# Patient Record
Sex: FEMALE | Race: WHITE | Employment: FULL TIME | URBAN - METROPOLITAN AREA
[De-identification: names, ages, dates, MRNs, and addresses within clinical notes are randomized per-mention and may not be internally consistent; named-entity substitution may affect disease eponyms.]

---

## 2017-03-13 ENCOUNTER — OFFICE VISIT (OUTPATIENT)
Dept: INTERNAL MEDICINE CLINIC | Age: 42
End: 2017-03-13

## 2017-03-13 VITALS
HEART RATE: 74 BPM | WEIGHT: 236.8 LBS | DIASTOLIC BLOOD PRESSURE: 74 MMHG | TEMPERATURE: 98.1 F | BODY MASS INDEX: 40.43 KG/M2 | OXYGEN SATURATION: 98 % | RESPIRATION RATE: 16 BRPM | HEIGHT: 64 IN | SYSTOLIC BLOOD PRESSURE: 114 MMHG

## 2017-03-13 DIAGNOSIS — E55.9 VITAMIN D DEFICIENCY: ICD-10-CM

## 2017-03-13 DIAGNOSIS — E53.8 B12 DEFICIENCY: ICD-10-CM

## 2017-03-13 DIAGNOSIS — Z00.00 WELL ADULT EXAM: Primary | ICD-10-CM

## 2017-03-13 DIAGNOSIS — M77.11 LATERAL EPICONDYLITIS, RIGHT ELBOW: ICD-10-CM

## 2017-03-13 NOTE — PATIENT INSTRUCTIONS
Tennis Elbow: Exercises  Your Care Instructions  Here are some examples of typical rehabilitation exercises for your condition. Start each exercise slowly. Ease off the exercise if you start to have pain. Your doctor or physical therapist will tell you when you can start these exercises and which ones will work best for you. How to do the exercises  Wrist flexor stretch    1. Extend your arm in front of you with your palm up. 2. Bend your wrist, pointing your hand toward the floor. 3. With your other hand, gently bend your wrist farther until you feel a mild to moderate stretch in your forearm. 4. Hold for at least 15 to 30 seconds. Repeat 2 to 4 times. Wrist extensor stretch    Repeat steps 1 to 4 of the stretch above but begin with your extended hand palm down. Ball or sock squeeze    1. Hold a tennis ball (or a rolled-up sock) in your hand. 2. Make a fist around the ball (or sock) and squeeze. 3. Hold for about 6 seconds, and then relax for up to 10 seconds. 4. Repeat 8 to 12 times. 5. Switch the ball (or sock) to your other hand and do 8 to 12 times. Wrist deviation    1. Sit so that your arm is supported but your hand hangs off the edge of a flat surface, such as a table. 2. Hold your hand out like you are shaking hands with someone. 3. Move your hand up and down. 4. Repeat this motion 8 to 12 times. 5. Switch arms. 6. Try to do this exercise twice with each hand. Wrist curls    1. Place your forearm on a table with your hand hanging over the edge of the table, palm up. 2. Place a 1- to 2-pound weight in your hand. This may be a dumbbell, a can of food, or a filled water bottle. 3. Slowly raise and lower the weight while keeping your forearm on the table and your palm facing up. 4. Repeat this motion 8 to 12 times. 5. Switch arms, and do steps 1 through 4.  6. Repeat with your hand facing down toward the floor. Switch arms. Biceps curls    1.  Sit leaning forward with your legs slightly spread and your left hand on your left thigh. 2. Place your right elbow on your right thigh, and hold the weight with your forearm horizontal.  3. Slowly curl the weight up and toward your chest.  4. Repeat this motion 8 to 12 times. 5. Switch arms, and do steps 1 through 4. Follow-up care is a key part of your treatment and safety. Be sure to make and go to all appointments, and call your doctor if you are having problems. It's also a good idea to know your test results and keep a list of the medicines you take. Where can you learn more? Go to http://lalo-pablo.info/. Enter Q212 in the search box to learn more about \"Tennis Elbow: Exercises. \"  Current as of: May 23, 2016  Content Version: 11.1  © 5529-9406 [employer]. Care instructions adapted under license by [employer] (which disclaims liability or warranty for this information). If you have questions about a medical condition or this instruction, always ask your healthcare professional. [address] any warranty or liability for your use of this information.

## 2017-03-13 NOTE — PROGRESS NOTES
HPI  Ms. Claudia Meng is a 46y.o. year old female, she is seen today to re-establish care, hasn't had a physical in a while. Sees ob/gyn yearly. Son is 132 years old, just potty trained. 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 [facility]. Complains of mild right elbow pain, worse with certain movements - no known injury. Exercises in morning - 30 min 3-4 x per week. Has had lap band filled again - has had 3 adjustments since last visit. Last adjustment 3/3/17 and couldn't keep anything down - readjusted again 3/8 and had fluid added back. Now able to keep food down. Lost 31# since last visit 3 years ago. Tingling in hands when wakes ups, doesn't notice other times of day. No cp, sob, cough, or wheezing. No change in bowels, melena or brbpr. Chief Complaint   Patient [facility] with   [facility].     Room 1// NON fasting    Elbow Pain     R elbow pain          Prior to Admission medications    Not on File         Allergies   Allergen Reactions    Hydrocodone Nausea and Vomiting         REVIEW OF SYSTEMS:  Per HPI    PHYSICAL EXAM:  Visit Vitals    /74 (BP 1 Location: Right arm, BP Patient Position: Sitting)    Pulse 74    Temp 98.1 °F (36.7 °C) (Oral)    Resp 16    Ht 5' 4\" (1.626 m)    Wt 236 lb 12.8 oz (107.4 kg)    LMP 03/09/2017 (Exact Date)    SpO2 98%    BMI 40.65 kg/m2     Constitutional: Appears well-developed and well-nourished. No distress. HENT:   Head: Normocephalic and atraumatic. Eyes: No scleral icterus. Mouth: OP clear without lesions, no pharyngeal exudate  Neck: no lad, no tm, supple   Cardiovascular: Normal S1/S2, regular rhythm. No murmurs, rubs, or gallops. Pulmonary/Chest: Effort normal and breath sounds normal. No respiratory distress. No wheezes, rhonchi, or rales. Abdomen: Soft, NT/ND, +BS, no rebound or guarding, +palpable port mid abdomen, no HSM appreciated.    Right elbow: +pain over lateral epicondylar groove  Ext: No edema. Neurological: Alert. Psychiatric: Normal mood and affect. Behavior is normal.     Lab Results   Component Value Date/Time    Sodium 141 02/04/2014 10:06 AM    Potassium 4.1 02/04/2014 10:06 AM    Chloride 103 02/04/2014 10:06 AM    CO2 21 02/04/2014 10:06 AM    Anion gap 12 10/26/2010 10:17 AM    Glucose 84 02/04/2014 10:06 AM    BUN 15 02/04/2014 10:06 AM    Creatinine 0.96 02/04/2014 10:06 AM    BUN/Creatinine ratio 16 02/04/2014 10:06 AM    GFR est AA 88 02/04/2014 10:06 AM    GFR est non-AA 76 02/04/2014 10:06 AM    Calcium 9.4 02/04/2014 10:06 AM    Bilirubin, total 0.5 02/04/2014 10:06 AM    AST (SGOT) 19 02/04/2014 10:06 AM    Alk. phosphatase 85 02/04/2014 10:06 AM    Protein, total 7.0 02/04/2014 10:06 AM    Albumin 4.3 02/04/2014 10:06 AM    Globulin 3.5 10/26/2010 10:17 AM    A-G Ratio 1.6 02/04/2014 10:06 AM    ALT (SGPT) 17 02/04/2014 10:06 AM     No results found for: HBA1C, HGBE8, MAU3TLHR, JBD5CRHA   Lab Results   Component Value Date/Time    Cholesterol, total 174 02/04/2014 10:06 AM    HDL Cholesterol 64 02/04/2014 10:06 AM    LDL, calculated 99 02/04/2014 10:06 AM    VLDL, calculated 11 02/04/2014 10:06 AM    Triglyceride 53 02/04/2014 10:06 AM    CHOL/HDL Ratio 3.4 10/26/2010 10:17 AM          ASSESSMENT/PLAN  Nino Nino was seen today for establish care and elbow pain. Diagnoses and all orders for this visit:    Well adult exam  -     METABOLIC PANEL, COMPREHENSIVE; Future  -     LIPID PANEL; Future  -     HEMOGLOBIN A1C WITH EAG; Future  Encouraged continued exercise, portion control    Vitamin D deficiency  -     VITAMIN D, 25 HYDROXY; Future    B12 deficiency  -     VITAMIN B12; Future    Lateral epicondylitis, right elbow  Can try counter brace, also handout with exercises given    For weight see #1 above    There are no preventive care reminders to display for this patient.      Follow-up Disposition:  Return in about 1 year (around 3/13/2018) for well exam. Reviewed plan of care. Patient has provided input and agrees with goals. The nurse provided the patient and/or family with advanced directive information if needed and encouraged the patient to provide a copy to the office when available.

## 2017-03-13 NOTE — PROGRESS NOTES
Patient received paperwork for advance directive during previous visit but has not completed at this time     Reviewed record In preparation for visit and have obtained necessary documentation      1. Have you been to the ER, urgent care clinic since your last visit? Hospitalized since your last visit? NO    2. Have you seen or consulted any other health care providers outside of the [facility] since your last visit?   Pt sees Dr Wei Mcgee for her lap band adjustments

## 2017-03-13 NOTE — MR AVS SNAPSHOT
Visit Information Date & Time Provider Department Dept. Phone Encounter #  
 3/13/2017  3:15 PM Jaime Reyes MD [address] [place] [address] [phone] [id number] Follow-up Instructions Return in about 1 year (around 3/13/2018) for well exam. Upcoming Health Maintenance Date Due  
 PAP AKA CERVICAL CYTOLOGY 4/30/2019 DTaP/Tdap/Td series (2 - Td) 10/4/2023 Allergies as of 3/13/2017  Review Complete On: 3/13/2017 By: Jaime Reyes MD  
  
 Severity Noted Reaction Type Reactions Hydrocodone  01/04/2010   Side Effect Nausea and Vomiting Current Immunizations  Reviewed on 1/28/2016 Name Date Influenza Nasal Vaccine (Quad) 12/4/2015 Influenza Vaccine 10/9/2013 Influenza Vaccine Split 10/26/2010 Tdap 10/4/2013 Not reviewed this visit You Were Diagnosed With   
  
 Codes Comments Well adult exam    -  Primary ICD-10-CM: Z00.00 ICD-9-CM: V70.0 Vitamin D deficiency     ICD-10-CM: E55.9 ICD-9-CM: 268.9 B12 deficiency     ICD-10-CM: E53.8 ICD-9-CM: 266.2 Lateral epicondylitis, right elbow     ICD-10-CM: M77.11 ICD-9-CM: 726.32 Vitals BP Pulse Temp Resp Height(growth percentile) Weight(growth percentile) 114/74 (BP 1 Location: Right arm, BP Patient Position: Sitting) 74 98.1 °F (36.7 °C) (Oral) 16 5' 4\" (1.626 m) 236 lb 12.8 oz (107.4 kg) LMP SpO2 BMI OB Status Smoking Status 03/09/2017 (Exact Date) 98% 40.65 kg/m2 Having regular periods Former Smoker Vitals History BMI and BSA Data Body Mass Index Body Surface Area  
 40.65 kg/m 2 2.2 m 2 Preferred Pharmacy Pharmacy Name Phone [id number] [phone] Your Updated Medication List  
  
Notice  As of 3/13/2017  4:16 PM  
 You have not been prescribed any medications. Follow-up Instructions  Return in about 1 year (around 3/13/2018) for well exam.  
  
 To-Do List   
 03/14/2017 Lab:  HEMOGLOBIN A1C WITH EAG   
  
 03/14/2017 Lab:  LIPID PANEL   
  
 03/14/2017 Lab:  METABOLIC PANEL, COMPREHENSIVE   
  
 03/14/2017 Lab:  VITAMIN B12   
  
 03/14/2017 Lab:  VITAMIN D, 25 HYDROXY Patient Instructions Tennis Elbow: Exercises Your Care Instructions Here are some examples of typical rehabilitation exercises for your condition. Start each exercise slowly. Ease off the exercise if you start to have pain. Your doctor or physical therapist will tell you when you can start these exercises and which ones will work best for you. How to do the exercises Wrist flexor stretch 1. Extend your arm in front of you with your palm up. 2. Bend your wrist, pointing your hand toward the floor. 3. With your other hand, gently bend your wrist farther until you feel a mild to moderate stretch in your forearm. 4. Hold for at least 15 to 30 seconds. Repeat 2 to 4 times. Wrist extensor stretch Repeat steps 1 to 4 of the stretch above but begin with your extended hand palm down. Ball or sock squeeze 1. Hold a tennis ball (or a rolled-up sock) in your hand. 2. Make a fist around the ball (or sock) and squeeze. 3. Hold for about 6 seconds, and then relax for up to 10 seconds. 4. Repeat 8 to 12 times. 5. Switch the ball (or sock) to your other hand and do 8 to 12 times. Wrist deviation 1. Sit so that your arm is supported but your hand hangs off the edge of a flat surface, such as a table. 2. Hold your hand out like you are shaking hands with someone. 3. Move your hand up and down. 4. Repeat this motion 8 to 12 times. 5. Switch arms. 6. Try to do this exercise twice with each hand. Wrist curls 1. Place your forearm on a table with your hand hanging over the edge of the table, palm up. 2. Place a 1- to 2-pound weight in your hand. This may be a dumbbell, a can of food, or a filled water bottle. 3. Slowly raise and lower the weight while keeping your forearm on the table and your palm facing up. 4. Repeat this motion 8 to 12 times. 5. Switch arms, and do steps 1 through 4. 
6. Repeat with your hand facing down toward the floor. Switch arms. Biceps curls 1. Sit leaning forward with your legs slightly spread and your left hand on your left thigh. 2. Place your right elbow on your right thigh, and hold the weight with your forearm horizontal. 
3. Slowly curl the weight up and toward your chest. 
4. Repeat this motion 8 to 12 times. 5. Switch arms, and do steps 1 through 4. Follow-up care is a key part of your treatment and safety. Be sure to make and go to all appointments, and call your doctor if you are having problems. It's also a good idea to know your test results and keep a list of the medicines you take. Where can you learn more? Go to http://lalo-pablo.info/. Enter H776 in the search box to learn more about \"Tennis Elbow: Exercises. \" Current as of: May 23, 2016 Content Version: 11.1 © 9887-7768 [employer]. Care instructions adapted under license by [employer] (which disclaims liability or warranty for this information). If you have questions about a medical condition or this instruction, always ask your healthcare professional. [address] any warranty or liability for your use of this information. Introducing hospitals & HEALTH SERVICES! [employer] introduces [employer] patient portal. Now you can access parts of your medical record, email your doctor's office, and request medication refills online. 1. In your internet browser, go to [email or web address] 2. Click on the First Time User? Click Here link in the Sign In box. You will see the New Member Sign Up page. 3. Enter your Lead[employer] Access Code exactly as it appears below.  You will not need to use this code after youve completed the sign-up process. If you do not sign up before the expiration date, you must request a new code. · [employer] Access Code: [email or web address] Expires: 6/11/2017  4:16 PM 
 
4. Enter the last four digits of your Social Security Number (xxxx) and Date of Birth (mm/dd/yyyy) as indicated and click Submit. You will be taken to the next sign-up page. 5. Create a WeOwe ID. This will be your WeOwe login ID and cannot be changed, so think of one that is secure and easy to remember. 6. Create a We[employer]we password. You can change your password at any time. 7. Enter your Password Reset Question and Answer. This can be used at a later time if you forget your password. 8. Enter your e-mail address. You will receive e-mail notification when new information is available in [address]. 9. Click Sign Up. You can now view and download portions of your medical record. 10. Click the Download Summary menu link to download a portable copy of your medical information. If you have questions, please visit the Frequently Asked Questions section of the [employer] website. Remember, [employer]we is NOT to be used for urgent needs. For medical emergencies, dial 911. Now available from your iPhone and Android! Please provide this summary of care documentation to your next provider. Your primary care clinician is listed as Julius Joyce. If you have any questions after today's visit, please call [phone].

## 2017-04-05 LAB
25(OH)D3+25(OH)D2 SERPL-MCNC: 29.5 NG/ML (ref 30–100)
ALBUMIN SERPL-MCNC: 4.1 G/DL (ref 3.5–5.5)
ALBUMIN/GLOB SERPL: 1.6 {RATIO} (ref 1.2–2.2)
ALP SERPL-CCNC: 58 IU/L (ref 39–117)
ALT SERPL-CCNC: 13 IU/L (ref 0–32)
AST SERPL-CCNC: 13 IU/L (ref 0–40)
BILIRUB SERPL-MCNC: 0.4 MG/DL (ref 0–1.2)
BUN SERPL-MCNC: 19 MG/DL (ref 6–20)
BUN/CREAT SERPL: 24 (ref 9–23)
CALCIUM SERPL-MCNC: 8.7 MG/DL (ref 8.7–10.2)
CHLORIDE SERPL-SCNC: 104 MMOL/L (ref 96–106)
CHOLEST SERPL-MCNC: 142 MG/DL (ref 100–199)
CO2 SERPL-SCNC: 22 MMOL/L (ref 18–29)
CREAT SERPL-MCNC: 0.78 MG/DL (ref 0.57–1)
EST. AVERAGE GLUCOSE BLD GHB EST-MCNC: 103 MG/DL
GLOBULIN SER CALC-MCNC: 2.6 G/DL (ref 1.5–4.5)
GLUCOSE SERPL-MCNC: 84 MG/DL (ref 65–99)
HBA1C MFR BLD: 5.2 % (ref 4.8–5.6)
HDLC SERPL-MCNC: 61 MG/DL
INTERPRETATION, 910389: NORMAL
LDLC SERPL CALC-MCNC: 72 MG/DL (ref 0–99)
POTASSIUM SERPL-SCNC: 4.2 MMOL/L (ref 3.5–5.2)
PROT SERPL-MCNC: 6.7 G/DL (ref 6–8.5)
SODIUM SERPL-SCNC: 142 MMOL/L (ref 134–144)
TRIGL SERPL-MCNC: 47 MG/DL (ref 0–149)
VIT B12 SERPL-MCNC: 405 PG/ML (ref 211–946)
VLDLC SERPL CALC-MCNC: 9 MG/DL (ref 5–40)

## 2017-08-31 ENCOUNTER — OFFICE VISIT (OUTPATIENT)
Dept: INTERNAL MEDICINE CLINIC | Age: 42
End: 2017-08-31

## 2017-08-31 VITALS
HEIGHT: 64 IN | WEIGHT: 238.4 LBS | RESPIRATION RATE: 16 BRPM | DIASTOLIC BLOOD PRESSURE: 76 MMHG | OXYGEN SATURATION: 96 % | TEMPERATURE: 98.4 F | SYSTOLIC BLOOD PRESSURE: 114 MMHG | HEART RATE: 78 BPM | BODY MASS INDEX: 40.7 KG/M2

## 2017-08-31 DIAGNOSIS — J01.10 ACUTE NON-RECURRENT FRONTAL SINUSITIS: Primary | ICD-10-CM

## 2017-08-31 RX ORDER — BENZONATATE 200 MG/1
200 CAPSULE ORAL
Qty: 21 CAP | Refills: 0 | Status: SHIPPED | OUTPATIENT
Start: 2017-08-31 | End: 2017-09-07

## 2017-08-31 RX ORDER — AMOXICILLIN AND CLAVULANATE POTASSIUM 875; 125 MG/1; MG/1
1 TABLET, FILM COATED ORAL EVERY 12 HOURS
Qty: 20 TAB | Refills: 0 | Status: SHIPPED | OUTPATIENT
Start: 2017-08-31 | End: 2017-09-10

## 2017-08-31 NOTE — PROGRESS NOTES
Fidencio Anne  Identified pt with two pt identifiers(name and ). Chief Complaint   Patient presents with    Cough     Room 2// [facility] // \"solid green mucous\"    Sinus Pain     fatigue and HA       1. Have you been to the ER, urgent care clinic since your last visit? Hospitalized since your last visit? UC in [place] x 3 weeks ago for cough     2. Have you seen or consulted any other health care providers outside of the [address] [place] [address] since your last visit? Include any pap smears or colon screening. NO      Dr Eli Nguyen notified of reason for visit and vitals    Patient received paperwork for advance directive during previous visit but has not completed at this time     Reviewed record In preparation for visit, huddled with provider and have obtained necessary documentation      There are no preventive care reminders to display for this patient. Wt Readings from Last 3 Encounters:   17 238 lb 6.4 oz (108.1 kg)   17 236 lb 12.8 oz (107.4 kg)   14 267 lb (121.1 kg)     Temp Readings from Last 3 Encounters:   17 98.1 °F (36.7 °C) (Oral)   14 99 °F (37.2 °C) (Tympanic)   13 97.2 °F (36.2 °C) (Tympanic)     BP Readings from Last 3 Encounters:   17 114/74   14 124/70   13 122/73     Pulse Readings from Last 3 Encounters:   17 74   14 61   13 78         Learning Assessment:  :     No flowsheet data found. Depression Screening:  :     No flowsheet data found. Fall Risk Assessment:  :     No flowsheet data found. Abuse Screening:  :     No flowsheet data found. I have received verbal consent from Fidencio Anne to discuss any/all medical information while they are present in the room. Medication reconciliation up to date and corrected with patient at this time.

## 2017-08-31 NOTE — MR AVS SNAPSHOT
Visit Information Date & Time Provider Department Dept. Phone Encounter #  
 8/31/2017  3:00 PM Vlad Cantu MD Juan Santillan [phone] [id number] Follow-up Instructions Return if symptoms worsen or fail to improve. Upcoming Health Maintenance Date Due  
 PAP AKA CERVICAL CYTOLOGY 4/30/2019 DTaP/Tdap/Td series (2 - Td) 10/4/2023 Allergies as of 8/31/2017  Review Complete On: 8/31/2017 By: Vlad Cantu MD  
  
 Severity Noted Reaction Type Reactions Hydrocodone  01/04/2010   Side Effect Nausea and Vomiting Current Immunizations  Reviewed on 1/28/2016 Name Date Influenza Nasal Vaccine (Quad) 12/4/2015 Influenza Vaccine 10/9/2013 Influenza Vaccine Split 10/26/2010 Tdap 10/4/2013 Not reviewed this visit You Were Diagnosed With   
  
 Codes Comments Acute non-recurrent frontal sinusitis    -  Primary ICD-10-CM: J01.10 ICD-9-CM: 785.8 Vitals BP Pulse Temp Resp Height(growth percentile) Weight(growth percentile) 114/76 (BP 1 Location: Right arm, BP Patient Position: Sitting) 78 98.4 °F (36.9 °C) (Oral) 16 5' 4\" (1.626 m) 238 lb 6.4 oz (108.1 kg) LMP SpO2 BMI OB Status Smoking Status 08/17/2017 96% 40.92 kg/m2 Having regular periods Former Smoker Vitals History BMI and BSA Data Body Mass Index Body Surface Area 40.92 kg/m 2 2.21 m 2 Preferred Pharmacy Pharmacy Name Phone [id number] Rashida Yoo [phone] Your Updated Medication List  
  
   
This list is accurate as of: 8/31/17  3:29 PM.  Always use your most recent med list.  
  
  
  
  
 amoxicillin-clavulanate 875-125 mg per tablet Commonly known as:  AUGMENTIN Take 1 Tab by mouth every twelve (12) hours for 10 days. benzonatate 200 mg capsule Commonly known as:  TESSALON Take 1 Cap by mouth three (3) times daily as needed for Cough for up to 7 days.  
  
  
  
  
Prescriptions Sent to Pharmacy Refills  
 benzonatate (TESSALON) 200 mg capsule 0 Sig: Take 1 Cap by mouth three (3) times daily as needed for Cough for up to 7 days. Class: Normal  
 Pharmacy: [id number] - [address] [address], [place] [facility] Ph #: [phone] Route: Oral  
 amoxicillin-clavulanate (AUGMENTIN) 875-125 mg per tablet 0 Sig: Take 1 Tab by mouth every twelve (12) hours for 10 days. Class: Normal  
 Pharmacy: [id number] - [place] [address], [place] [facility] Ph #: [phone] Route: Oral  
  
Follow-up Instructions Return if symptoms worsen or fail to improve. Patient Instructions Sinusitis: Care Instructions Your Care Instructions Sinusitis is an infection of the lining of the sinus cavities in your head. Sinusitis often follows a cold. It causes pain and pressure in your head and face. In most cases, sinusitis gets better on its own in 1 to 2 weeks. But some mild symptoms may last for several weeks. Sometimes antibiotics are needed. Follow-up care is a key part of your treatment and safety. Be sure to make and go to all appointments, and call your doctor if you are having problems. It's also a good idea to know your test results and keep a list of the medicines you take. How can you care for yourself at home? · Take an over-the-counter pain medicine, such as acetaminophen (Tylenol), ibuprofen (Advil, Motrin), or naproxen (Aleve). Read and follow all instructions on the label. · If the doctor prescribed antibiotics, take them as directed. Do not stop taking them just because you feel better. You need to take the full course of antibiotics. · Be careful when taking over-the-counter cold or flu medicines and Tylenol at the same time. Many of these medicines have acetaminophen, which is Tylenol.  Read the labels to make sure that you are not taking more than the recommended dose. Too much acetaminophen (Tylenol) can be harmful. · Breathe warm, moist air from a steamy shower, a hot bath, or a sink filled with hot water. Avoid cold, dry air. Using a humidifier in your home may help. Follow the directions for cleaning the machine. · Use saline (saltwater) nasal washes to help keep your nasal passages open and wash out mucus and bacteria. You can buy saline nose drops at a grocery store or drugstore. Or you can make your own at home by adding 1 teaspoon of salt and 1 teaspoon of baking soda to 2 cups of distilled water. If you make your own, fill a bulb syringe with the solution, insert the tip into your nostril, and squeeze gently. [employer] your nose. · Put a hot, wet towel or a warm gel pack on your face 3 or 4 times a day for 5 to 10 minutes each time. · Try a decongestant nasal spray like oxymetazoline (Afrin). Do not use it for more than 3 days in a row. Using it for more than 3 days can make your congestion worse. When should you call for help? Call your doctor now or seek immediate medical care if: 
· You have new or worse swelling or redness in your face or around your eyes. · You have a new or higher fever. Watch closely for changes in your health, and be sure to contact your doctor if: 
· You have new or worse facial pain. · The mucus from your nose becomes thicker (like pus) or has new blood in it. · You are not getting better as expected. Where can you learn more? Go to http://lalo-pablo.info/. Enter S965 in the search box to learn more about \"Sinusitis: Care Instructions. \" Current as of: July 29, 2016 Content Version: 11.3 © 0531-9054 mention. Care instructions adapted under license by [employer] (which disclaims liability or warranty for this information).  If you have questions about a medical condition or this instruction, always ask your healthcare professional. [employer] Morelos[employer] disclaims any warranty or liability for your use of this information. Introducing [facility] & HEALTH SERVICES! Jeramie Islas introduces [employer] patient portal. Now you can access parts of your medical record, email your doctor's office, and request medication refills online. 1. In your internet browser, go to [email or web address] 2. Click on the First Time User? Click Here link in the Sign In box. You will see the New Member Sign Up page. 3. Enter your [employer] Access Code exactly as it appears below. You will not need to use this code after youve completed the sign-up process. If you do not sign up before the expiration date, you must request a new code. · [employer] Access Code: [email or web address] Expires: 11/29/2017  3:27 PM 
 
4. Enter the last four digits of your Social Security Number (xxxx) and Date of Birth (mm/dd/yyyy) as indicated and click Submit. You will be taken to the next sign-up page. 5. Create a [employer] Systems ID. This will be your [employer] login ID and cannot be changed, so think of one that is secure and easy to remember. 6. Create a [employer] password. You can change your password at any time. 7. Enter your Password Reset Question and Answer. This can be used at a later time if you forget your password. 8. Enter your e-mail address. You will receive e-mail notification when new information is available in [address] 9. Click Sign Up. You can now view and download portions of your medical record. 10. Click the Download Summary menu link to download a portable copy of your medical information. If you have questions, please visit the Frequently Asked Questions section of the [employer] website. Remember, [employer] is NOT to be used for urgent needs. For medical emergencies, dial 911. Now available from your iPhone and Android! Please provide this summary of care documentation to your next provider. Your primary care clinician is listed as Julius Joyce. If you have any questions after today's visit, please call [phone].

## 2017-08-31 NOTE — PATIENT INSTRUCTIONS
Sinusitis: Care Instructions  Your Care Instructions    Sinusitis is an infection of the lining of the sinus cavities in your head. Sinusitis often follows a cold. It causes pain and pressure in your head and face. In most cases, sinusitis gets better on its own in 1 to 2 weeks. But some mild symptoms may last for several weeks. Sometimes antibiotics are needed. Follow-up care is a key part of your treatment and safety. Be sure to make and go to all appointments, and call your doctor if you are having problems. It's also a good idea to know your test results and keep a list of the medicines you take. How can you care for yourself at home? · Take an over-the-counter pain medicine, such as acetaminophen (Tylenol), ibuprofen (Advil, Motrin), or naproxen (Aleve). Read and follow all instructions on the label. · If the doctor prescribed antibiotics, take them as directed. Do not stop taking them just because you feel better. You need to take the full course of antibiotics. · Be careful when taking over-the-counter cold or flu medicines and Tylenol at the same time. Many of these medicines have acetaminophen, which is Tylenol. Read the labels to make sure that you are not taking more than the recommended dose. Too much acetaminophen (Tylenol) can be harmful. · Breathe warm, moist air from a steamy shower, a hot bath, or a sink filled with hot water. Avoid cold, dry air. Using a humidifier in your home may help. Follow the directions for cleaning the machine. · Use saline (saltwater) nasal washes to help keep your nasal passages open and wash out mucus and bacteria. You can buy saline nose drops at a grocery store or drugstore. Or you can make your own at home by adding 1 teaspoon of salt and 1 teaspoon of baking soda to 2 cups of distilled water. If you make your own, fill a bulb syringe with the solution, insert the tip into your nostril, and squeeze gently. Krystle Stager your nose.   · Put a hot, wet towel or a warm gel pack on your face 3 or 4 times a day for 5 to 10 minutes each time. · Try a decongestant nasal spray like oxymetazoline (Afrin). Do not use it for more than 3 days in a row. Using it for more than 3 days can make your congestion worse. When should you call for help? Call your doctor now or seek immediate medical care if:  · You have new or worse swelling or redness in your face or around your eyes. · You have a new or higher fever. Watch closely for changes in your health, and be sure to contact your doctor if:  · You have new or worse facial pain. · The mucus from your nose becomes thicker (like pus) or has new blood in it. · You are not getting better as expected. Where can you learn more? Go to http://lalo-pablo.info/. Enter W362 in the search box to learn more about \"Sinusitis: Care Instructions. \"  Current as of: July 29, 2016  Content Version: 11.3  © 9221-7360 [employer]. Care instructions adapted under license by [employer] (which disclaims liability or warranty for this information). If you have questions about a medical condition or this instruction, always ask your healthcare professional. [place] [address] any warranty or liability for your use of this information.

## 2017-08-31 NOTE — PROGRESS NOTES
HPI  Ms. Gail Staples is a 38y.o. year old female, she is seen today for bronchitis. Started as a cough about 3 weeks ago. Went to  with rash and cough and feeling weak. Was treated with prednisone and amoxicillin while in [address] [facility] about 3 weeks ago. Rash cleared within a day. Cough hasn't resolved. Also has frontal sinus pain x 2 days. Also postnasal drip, coughing up thick green mucous. Feeling tired, no f/c. Ears are popping. Taking mucinex and robitussin DM. Symptoms didn't improve at all with amoxicillin except for rash. Chief Complaint   Patient presents with    Cough     Room 2// saw  in [address] (prednisone and Amox) x 3 weeks ago // \"solid green mucous\"// NON fasting     Sinus Pain     fatigue and HA// using Mucinex, Robitussen DM        Prior to Admission medications    Not on File         Allergies   Allergen Reactions    Hydrocodone Nausea and Vomiting         REVIEW OF SYSTEMS:  Per HPI    PHYSICAL EXAM:  Visit Vitals    /76 (BP 1 Location: Right arm, BP Patient Position: Sitting)    Pulse 78    Temp 98.4 °F (36.9 °C) (Oral)    Resp 16    Ht 5' 4\" (1.626 m)    Wt 238 lb 6.4 oz (108.1 kg)    SpO2 96%    BMI 40.92 kg/m2     Constitutional: Appears well-developed and well-nourished. No distress. HENT:   Head: Normocephalic and atraumatic. +maxillary sinus tenderness to percussion  Eyes: No scleral icterus. Ears: tm's wnl   Nose: nasal mucosa congested  Mouth: OP clear without lesions, no pharyngeal exudate  Neck: no lad, no tm, supple   Cardiovascular: Normal S1/S2, regular rhythm. No murmurs, rubs, or gallops. Pulmonary/Chest: Effort normal and breath sounds normal. No respiratory distress. No wheezes, rhonchi, or rales. Neurological: Alert. Psychiatric: Normal mood and affect.  Behavior is normal.     Lab Results   Component Value Date/Time    Sodium 142 04/04/2017 10:37 AM    Potassium 4.2 04/04/2017 10:37 AM    Chloride 104 04/04/2017 10:37 AM    CO2 22 04/04/2017 10:37 AM    Anion gap 12 10/26/2010 10:17 AM    Glucose 84 04/04/2017 10:37 AM    BUN 19 04/04/2017 10:37 AM    Creatinine 0.78 04/04/2017 10:37 AM    BUN/Creatinine ratio 24 04/04/2017 10:37 AM    GFR est  04/04/2017 10:37 AM    GFR est non-AA 96 04/04/2017 10:37 AM    Calcium 8.7 04/04/2017 10:37 AM    Bilirubin, total 0.4 04/04/2017 10:37 AM    AST (SGOT) 13 04/04/2017 10:37 AM    Alk. phosphatase 58 04/04/2017 10:37 AM    Protein, total 6.7 04/04/2017 10:37 AM    Albumin 4.1 04/04/2017 10:37 AM    Globulin 3.5 10/26/2010 10:17 AM    A-G Ratio 1.6 04/04/2017 10:37 AM    ALT (SGPT) 13 04/04/2017 10:37 AM     Lab Results   Component Value Date/Time    Hemoglobin A1c 5.2 04/04/2017 10:37 AM      Lab Results   Component Value Date/Time    Cholesterol, total 142 04/04/2017 10:37 AM    HDL Cholesterol 61 04/04/2017 10:37 AM    LDL, calculated 72 04/04/2017 10:37 AM    VLDL, calculated 9 04/04/2017 10:37 AM    Triglyceride 47 04/04/2017 10:37 AM    CHOL/HDL Ratio 3.4 10/26/2010 10:17 AM          ASSESSMENT/PLAN  Diagnoses and all orders for this visit:    1. Acute non-recurrent frontal sinusitis  -     benzonatate (TESSALON) 200 mg capsule; Take 1 Cap by mouth three (3) times daily as needed for Cough for up to 7 days. -     amoxicillin-clavulanate (AUGMENTIN) 875-125 mg per tablet; Take 1 Tab by mouth every twelve (12) hours for 10 days. failed amoxicillin - treat as above      There are no preventive care reminders to display for this patient. Follow-up Disposition:  Return if symptoms worsen or fail to improve. Reviewed plan of care. Patient has provided input and agrees with goals. The nurse provided the patient and/or family with advanced directive information if needed and encouraged the patient to provide a copy to the office when available.

## 2017-10-09 ENCOUNTER — TELEPHONE (OUTPATIENT)
Dept: INTERNAL MEDICINE CLINIC | Age: 42
End: 2017-10-09

## 2017-10-09 NOTE — TELEPHONE ENCOUNTER
Patient reports hyperextending knee while doing an insanity workout. Patient would like to be seen or get referral to ortho. Patient advised to go to urgent care for eval and xray. Patient agrees.

## 2017-10-09 NOTE — TELEPHONE ENCOUNTER
Patient called to speak with nurse in regards to her left knee pain and if she could get a referral to an orthopedic or if she needs to come in for an appointment.

## 2018-02-02 ENCOUNTER — OFFICE VISIT (OUTPATIENT)
Dept: SURGERY | Age: 43
End: 2018-02-02

## 2018-02-02 VITALS
RESPIRATION RATE: 18 BRPM | HEIGHT: 64 IN | BODY MASS INDEX: 40.6 KG/M2 | DIASTOLIC BLOOD PRESSURE: 90 MMHG | OXYGEN SATURATION: 98 % | HEART RATE: 72 BPM | WEIGHT: 237.8 LBS | TEMPERATURE: 99 F | SYSTOLIC BLOOD PRESSURE: 136 MMHG

## 2018-02-02 DIAGNOSIS — R13.19 OTHER DYSPHAGIA: ICD-10-CM

## 2018-02-02 DIAGNOSIS — E66.01 MORBID OBESITY (HCC): Primary | ICD-10-CM

## 2018-02-02 DIAGNOSIS — Z98.84 HISTORY OF ADJUSTABLE GASTRIC BANDING: ICD-10-CM

## 2018-02-02 RX ORDER — CLINDAMYCIN HYDROCHLORIDE 300 MG/1
CAPSULE ORAL
COMMUNITY
Start: 2018-01-25 | End: 2019-03-13

## 2018-02-02 NOTE — PROGRESS NOTES
New Bariatric: Hx of Gastric Banding     Alex Cesar is a 38 y.o. female with a history of laparoscopic adjustable gastric band surgery (AP Standard) in Jan 2010 at [facility] and followed by [facility]. she has been experiencing stabilization of and occasional eipisodes of food. This happens about 2 x per week. Records from July 2010 notes that the patient was not measuring food, not exercising, and was experiencing epigastric pain \"likely due to quick eating habits. \"    Starting BMI was 48 kg/(m^2) and ~295 lbs. She reports her lowest weight was 213 lbs, but soon gained weight back due to pregnancy. Current weight is 237 lbs 12.8 oz with  Body mass index is 40.82 kg/(m^2). To date, patient is 58 lbs down. On March 3rd, 2018 - patient had 4.5 CCs in band with 0.5 CC added: total of 5.0 CC in band. On March 8th, three days later, patient returned because she was unable to keep fluid down. 1 cc of fluid was taken out which allowed her to swallow w/o difficulty: total of 4.0 CC in band. May 2017 was the last adjustment and band fill, with weight being stable since then. She confirms previously experiencing acid reflux, regurgitation, and but attributes these symptoms to poor food choices and eating too quickly. Not measuring meals, but \"at least 1 cup each meal.\"  Reports no problems with port. Dietary habits:  Breakfast -- 15 mg protein shake with whole milk   Lunch -- salad, chicken salad and crackers   Dinner -- (palm sized) medium rare ribeye steak with potato/salad/artichoke; chicken wings (6-8 per meal)   Snacks: cheez-its, peanuts, chips     Exercise:   Insanity workout at home 2-3x/week   High-impact cardio on treadmill       Pt is self-referred.      Patient Active Problem List    Diagnosis Date Noted    History of laparoscopic adjustable gastric banding 01/03/2010     Past Medical History:   Diagnosis Date    History of laparoscopic adjustable gastric banding 01/03/2010      Past Surgical History:   Procedure Laterality Date    HX OTHER SURGICAL  01/2010    Lap gastric banding. Social History   Substance Use Topics    Smoking status: Former Smoker     Quit date: 2/2/2008    Smokeless tobacco: Never Used    Alcohol use Yes      Family History   Problem Relation Age of Onset    Diabetes Father     Stroke Father     Prostate Cancer Father       Prior to Admission medications    Medication Sig Start Date End Date Taking? Authorizing Provider   clindamycin (CLEOCIN) 300 mg capsule  1/25/18  Yes Historical Provider     No Known Allergies       Review of Systems:  A comprehensive review of 12 systems was negative. Visit Vitals    /90    Pulse 72    Temp 99 °F (37.2 °C) (Oral)    Resp 18    Ht 5' 4\" (1.626 m)    Wt 237 lb 12.8 oz (107.9 kg)    SpO2 98%    BMI 40.82 kg/m2       Physical Exam:    General:  alert, cooperative, no distress, appears stated age   Eyes:  conjunctivae and sclerae normal   Throat & Neck: no erythema or exudates noted and neck supple and symmetrical; no palpable masses Mallamapatti class 2   Lungs:   clear to auscultation bilaterally   Heart:  Regular rate and rhythm   Abdomen:   abdomen is soft and obese without significant tenderness, masses, organomegaly or guarding, normal bowel sounds,  Port palpable in mid epigastric region    Extremities: extremities normal, atraumatic, no edema   Skin: Normal.   Neuro: Mental status: Alert, oriented, thought content appropriate  Gait: Normal   Lymphatic: No supraclavicular adenopathy             Assessment:   Diagnoses and all orders for this visit:    1. Morbid obesity ([address]    2. Other dysphagia    3. History of adjustable gastric banding        Insufficient weight loss and occasional sensations of food getting stuck seems to be attributed to poor dietary choices and not measuring meals.  Discussed the possibility of stretching the gastric pouch with the lack measuring meals, eating in large amounts. Will defer the patient to dietician prior to considering adjustment of band. Patient has been at a stable weight since last band adjustment in May 2017. Recommendation:     1. Recommend the patient proceed to dietician to review bariatric diets and habits. 2. Follow up after seeing Gabbie Fritz dietician, to consider adjustment. 12:03 PM 12:24 PM  Total time spent with patient, more than 50% of the time was spent in counselin minutes.     Signed By: Anton Nathan MD    2018          Written by [facility], as dictated by Anton Nathan MD.

## 2018-02-02 NOTE — MR AVS SNAPSHOT
[address] [facility] [address] 
[phone] Patient: Tam Catherine MRN: [id number] :1975 Visit Information Date & Time Provider Department Dept. Phone Encounter #  
 2018 11:40 AM Danny Guajardo MD [place] [phone] [id number] Allergies as of 2018  Review Complete On: 2018 By: Irena Ojeda LPN No Known Allergies Current Immunizations  Never Reviewed No immunizations on file. Not reviewed this visit You Were Diagnosed With   
  
 Codes Comments History of adjustable gastric banding    -  Primary ICD-10-CM: N60.66 ICD-9-CM: V45.86 Vitals BP Pulse Temp Resp Height(growth percentile) Weight(growth percentile) 136/90 72 99 °F (37.2 °C) (Oral) 18 5' 4\" (1.626 m) 237 lb 12.8 oz (107.9 kg) SpO2 BMI Smoking Status 98% 40.82 kg/m2 Former Smoker Vitals History BMI and BSA Data Body Mass Index Body Surface Area  
 40.82 kg/m 2 2.21 m 2 Your Updated Medication List  
  
   
This list is accurate as of: 18 12:12 PM.  Always use your most recent med list.  
  
  
  
  
 clindamycin 300 mg capsule Commonly known as:  CLEOCIN Introducing [facility] & HEALTH SERVICES! Kathleen Painter introduces [employer] patient portal. Now you can access parts of your medical record, email your doctor's office, and request medication refills online. 1. In your internet browser, go to [email or web address] 2. Click on the First Time User? Click Here link in the Sign In box. You will see the New Member Sign Up page. 3. Enter your iPeen Access Code exactly as it appears below. You will not need to use this code after youve completed the sign-up process. If you do not sign up before the expiration date, you must request a new code. · iPeen Access Code: [email or web address] Expires: 5/3/2018 11:51 AM 
 
 4. Enter the last four digits of your Social Security Number (xxxx) and Date of Birth (mm/dd/yyyy) as indicated and click Submit. You will be taken to the next sign-up page. 5. Create a Servoy ID. This will be your Servoy login ID and cannot be changed, so think of one that is secure and easy to remember. 6. Create a Servoy password. You can change your password at any time. 7. Enter your Password Reset Question and Answer. This can be used at a later time if you forget your password. 8. Enter your e-mail address. You will receive e-mail notification when new information is available in [address] 9. Click Sign Up. You can now view and download portions of your medical record. 10. Click the Download Summary menu link to download a portable copy of your medical information. If you have questions, please visit the Frequently Asked Questions section of the Servoy website. Remember, Servoy is NOT to be used for urgent needs. For medical emergencies, dial 911. Now available from your iPhone and Android! Please provide this summary of care documentation to your next provider. Your primary care clinician is listed as Julius Joyce. If you have any questions after today's visit, please call [phone].

## 2018-02-02 NOTE — PROGRESS NOTES
1. Have you been to the ER, urgent care clinic since your last visit? Hospitalized since your last visit? Yes When: 1/24/18 [facility] for right foot pain. 2. Have you seen or consulted any other health care providers outside of the [address] [place] [address] since your last visit? Include any pap smears or colon screening.  Yes When: 1/24/18 [facility] ED

## 2018-11-13 ENCOUNTER — TELEPHONE (OUTPATIENT)
Dept: INTERNAL MEDICINE CLINIC | Facility: CLINIC | Age: 43
End: 2018-11-13

## 2018-11-13 NOTE — TELEPHONE ENCOUNTER
Spoke to patient said she had hemorrhoid for 2 weeks, bleeding since Thursday 11/8. Bleeding lite amount wearing pad and changing one time a day. Dr. Corey Nolasco advised patient to use Tucks or Prep H and make GI appointment. Given two different GI office numbers and told to call and make appointment sooner then later. Advise to go to ER if bleeding get worse.

## 2018-11-13 NOTE — TELEPHONE ENCOUNTER
Pt called to speak with the nurse in regards to her having a Hemorrhoids that is bleeding. Pt has some questions to ask before she makes an appt to be seen.

## 2019-01-31 ENCOUNTER — APPOINTMENT (OUTPATIENT)
Age: 44
Setting detail: DERMATOLOGY
End: 2019-02-01

## 2019-01-31 DIAGNOSIS — D22 MELANOCYTIC NEVI: ICD-10-CM

## 2019-01-31 DIAGNOSIS — Z80.8 FAMILY HISTORY OF MALIGNANT NEOPLASM OF OTHER ORGANS OR SYSTEMS: ICD-10-CM

## 2019-01-31 DIAGNOSIS — I83.9 ASYMPTOMATIC VARICOSE VEINS OF LOWER EXTREMITIES: ICD-10-CM

## 2019-01-31 DIAGNOSIS — D18.0 HEMANGIOMA: ICD-10-CM

## 2019-01-31 DIAGNOSIS — Z71.89 OTHER SPECIFIED COUNSELING: ICD-10-CM

## 2019-01-31 DIAGNOSIS — L81.4 OTHER MELANIN HYPERPIGMENTATION: ICD-10-CM

## 2019-01-31 PROBLEM — D22.5 MELANOCYTIC NEVI OF TRUNK: Status: ACTIVE | Noted: 2019-01-31

## 2019-01-31 PROBLEM — D18.01 HEMANGIOMA OF SKIN AND SUBCUTANEOUS TISSUE: Status: ACTIVE | Noted: 2019-01-31

## 2019-01-31 PROBLEM — I83.91 ASYMPTOMATIC VARICOSE VEINS OF RIGHT LOWER EXTREMITY: Status: ACTIVE | Noted: 2019-01-31

## 2019-01-31 PROBLEM — D22.4 MELANOCYTIC NEVI OF SCALP AND NECK: Status: ACTIVE | Noted: 2019-01-31

## 2019-01-31 PROCEDURE — OTHER REASSURANCE: OTHER

## 2019-01-31 PROCEDURE — OTHER MIPS QUALITY: OTHER

## 2019-01-31 PROCEDURE — OTHER COUNSELING: OTHER

## 2019-01-31 PROCEDURE — 99203 OFFICE O/P NEW LOW 30 MIN: CPT

## 2019-01-31 ASSESSMENT — LOCATION DETAILED DESCRIPTION DERM
LOCATION DETAILED: LEFT POSTERIOR SHOULDER
LOCATION DETAILED: RIGHT INFERIOR LATERAL LOWER BACK
LOCATION DETAILED: RIGHT MEDIAL KNEE
LOCATION DETAILED: LEFT SUPERIOR UPPER BACK
LOCATION DETAILED: RIGHT SUPERIOR LATERAL UPPER BACK
LOCATION DETAILED: RIGHT DORSAL WRIST
LOCATION DETAILED: LEFT DISTAL RADIAL DORSAL FOREARM
LOCATION DETAILED: LEFT SUPERIOR ANTERIOR NECK

## 2019-01-31 ASSESSMENT — LOCATION ZONE DERM
LOCATION ZONE: TRUNK
LOCATION ZONE: LEG
LOCATION ZONE: NECK
LOCATION ZONE: ARM

## 2019-01-31 ASSESSMENT — LOCATION SIMPLE DESCRIPTION DERM
LOCATION SIMPLE: RIGHT KNEE
LOCATION SIMPLE: LEFT SHOULDER
LOCATION SIMPLE: LEFT FOREARM
LOCATION SIMPLE: RIGHT WRIST
LOCATION SIMPLE: RIGHT BACK
LOCATION SIMPLE: RIGHT LOWER BACK
LOCATION SIMPLE: LEFT UPPER BACK
LOCATION SIMPLE: LEFT ANTERIOR NECK

## 2019-03-08 ENCOUNTER — APPOINTMENT (OUTPATIENT)
Age: 44
Setting detail: DERMATOLOGY
End: 2019-03-11

## 2019-03-08 DIAGNOSIS — Z71.89 OTHER SPECIFIED COUNSELING: ICD-10-CM

## 2019-03-08 DIAGNOSIS — Z80.8 FAMILY HISTORY OF MALIGNANT NEOPLASM OF OTHER ORGANS OR SYSTEMS: ICD-10-CM

## 2019-03-08 DIAGNOSIS — L72.8 OTHER FOLLICULAR CYSTS OF THE SKIN AND SUBCUTANEOUS TISSUE: ICD-10-CM

## 2019-03-08 PROBLEM — D48.5 NEOPLASM OF UNCERTAIN BEHAVIOR OF SKIN: Status: ACTIVE | Noted: 2019-03-08

## 2019-03-08 PROCEDURE — OTHER ADDITIONAL NOTES: OTHER

## 2019-03-08 PROCEDURE — 99213 OFFICE O/P EST LOW 20 MIN: CPT | Mod: 25

## 2019-03-08 PROCEDURE — OTHER MIPS QUALITY: OTHER

## 2019-03-08 PROCEDURE — OTHER INTRALESIONAL KENALOG: OTHER

## 2019-03-08 PROCEDURE — OTHER COUNSELING: OTHER

## 2019-03-08 PROCEDURE — 11900 INJECT SKIN LESIONS </W 7: CPT

## 2019-03-08 ASSESSMENT — LOCATION SIMPLE DESCRIPTION DERM: LOCATION SIMPLE: POSTERIOR SCALP

## 2019-03-08 ASSESSMENT — LOCATION DETAILED DESCRIPTION DERM: LOCATION DETAILED: RIGHT OCCIPITAL SCALP

## 2019-03-08 ASSESSMENT — LOCATION ZONE DERM: LOCATION ZONE: SCALP

## 2019-03-08 NOTE — PROCEDURE: ADDITIONAL NOTES
Additional Notes: Ultrasound ordered to see nature of cyst prior to scheduling excision with debbie. Patient was seen in the ER earlier this week and was treated for a headache as lesion was painful and causing headaches for the patient. She states that she feels that lesion gets larger and bothers her more when it’s time for her menstrual cycle. No signs of infection noted today.
Detail Level: Simple

## 2019-03-08 NOTE — PROCEDURE: INTRALESIONAL KENALOG
Detail Level: Generalized
Medical Necessity Clause: This procedure was medically necessary because the lesions that were treated were:
Kenalog Preparation: Kenalog
Total Volume Injected (Ccs- Only Use Numbers And Decimals): 0.1
Concentration Of Solution Injected (Mg/Ml): 5.0
Include Z78.9 (Other Specified Conditions Influencing Health Status) As An Associated Diagnosis?: No
Treatment Number (Optional): 1
X Size Of Lesion In Cm (Optional): 0
Administered By (Optional): Roxann
Consent: The risks of atrophy were reviewed with the patient. Verbal consent obtained.

## 2019-03-11 NOTE — PROGRESS NOTES
HPI  Reed Garg is a 41y.o. year old female patient of Tigre Brewster MD who presents with c/o ER f/u for spot on head. Pt has history of has Obesity, Enlarged lymph nodes, Carpal tunnel syndrome on both sides, and History of laparoscopic adjustable gastric banding on their problem list.. Pt presents for ED f/u. Pt has not been seen in clinic since summer 2017. First noticed mass to right occipital approx 6 mos ago, always hurts and swells around her menstrual cycle. Points to right side occipital part of her head. Last Thursday it felt like an ice pick in back of her head. Couldn't comb hair hurt so bad, hurts to turn her head so went to the ED. Pain was 8/10, used ice and ibuprofen. Didn't take Fioricet from ED. Saw her dermatologist last Friday who ordered an [address], told not likely cancerous or tumor, told possible cyst, did cortizone shot. States they called her that night with US results- told collection of blood vessels and referred back to PCP. States she had similar issue 7-8 years ago. Denies assoc blurred vision or dizzines. Pain is now 3/10. Swelling has gone down, states injection helped. Chart Review  Pt was seen at [facility] ED on 3-6-19 for right sided mass and head pain, dx with tension HA treated with Fioricet, pt declined nerve block. Interface, Radiology Results In - 03/08/2019 2:43 PM EST    History: Painful lump of approximately 1 cm size in the right scalp region for one week. COMPARISON: None. TECHNIQUE: Grayscale and duplex sonography were performed using a linear, small parts, high-frequency transducer. FINDINGS:  There is a relatively well-defined hypoechoic focus within the soft tissues likely deep to the galea aponeurotica within the loose areolar tissue of the right parietal scalp region. This hypoechoic region may represent fluid, measuring 2.7 x 0.3 x 1.6 cm in greatest longitudinal, AP, and transverse dimensions respectively.  No diffuse hyperemia is noted on color Doppler interrogation in the perilesional region to suggest that this represents an abscess, however the possibility of a small resolving hematoma cannot be excluded. There is, however, a well-defined linear structure consistent with a vessel at the deep aspect of the collection, defined to better advantage on color Doppler interrogation. Please note that the deepest portion of the collection is approximately 0.8 cm below the skin surface. IMPRESSION:  Relatively well-defined longitudinal hypoechoic likely fluid collection just deep to the galea aponeurotica in the right parietal scalp region. Although there is no evidence of diffuse perilesional hyperemia on color Doppler interrogation, there is a well-defined vessel at the deep aspect of the collection. Patient Active Problem List   Diagnosis Code    Obesity E66.9    Enlarged lymph nodes R59.9    Carpal tunnel syndrome on both sides G56.03    History of laparoscopic adjustable gastric banding Z98.84     Past Medical History:   Diagnosis Date    History of laparoscopic adjustable gastric banding 2010     Past Surgical History:   Procedure Laterality Date    HC LAP BAND ADJUST PROCEDURE  2010    HX  SECTION  10/8/13    HX GI      lap band 2010    HX OTHER SURGICAL  2010    Lap gastric banding. Social History     Socioeconomic History    Marital status:      Spouse name: Not on file    Number of children: Not on file    Years of education: Not on file    Highest education level: Not on file   Tobacco Use    Smoking status: Former Smoker     Packs/day: 0.25     Last attempt to quit: 2008     Years since quittin.1    Smokeless tobacco: Never Used   Substance and Sexual Activity    Alcohol use:  Yes     Alcohol/week: 1.2 oz     Types: 2 Cans of beer per week     Comment: socially    Drug use: No    Sexual activity: Yes     Partners: Male     Birth control/protection: None   Social History Narrative    ** Merged History Encounter **          Family History   Problem Relation Age of Onset    Diabetes Father     Stroke Father 72        TIA    Prostate Cancer Father     Cancer Father         prostate    Cancer Paternal Grandmother         colon    Heart Disease Maternal Grandfather      Allergies   Allergen Reactions    Hydrocodone Nausea and Vomiting       MEDICATIONS  No current outpatient medications on file. No current facility-administered medications for this visit. REVIEW OF SYSTEMS  Per HPI        Visit Vitals  /74 (BP 1 Location: Left arm, BP Patient Position: Sitting)   Pulse 70   Temp 98.4 °F (36.9 °C) (Oral)   Resp 18   Ht 5' 4\" (1.626 m)   Wt 212 lb 6.4 oz (96.3 kg)   LMP 03/07/2019   SpO2 98%   BMI 36.46 kg/m²         General: Well-developed, well-nourished. In no distress. A&O x 3. Head: Normocephalic, atraumatic. Eyes: Conjunctiva clear. Pupils equal, round, reactive to light. Extraocular movements intact. Skin: Small moveable mass below skin surface of right occipital that is non-tender to palpation. Musculoskeletal: Gait normal.   Psychiatric: Normal mood and affect. Behavior is normal.       No results found for any visits on 03/13/19. ASSESSMENT and PLAN  Diagnoses and all orders for this visit:    1. Mass of occipital region  -     REFERRAL TO GENERAL SURGERY  Pt's symptoms with marked improvement. She is concerned about regrowth of the mass/cyst and/or the pain returning. Will refer to gen surgery for further interpretation of US results and recommendations for removal if pt's chooses. Please keep your follow-up appointment with Armando Benavides MD.     Follow-up Disposition:  Return in about 3 months (around 6/13/2019), or if symptoms worsen or fail to improve, for Pls make routine f/u with Dr. Drea Remy.     Health Maintenance Due   Topic Date Due    PAP AKA CERVICAL CYTOLOGY  04/30/2019 I have discussed the diagnosis with the patient and the intended plan as seen in the above orders. Patient is in agreement. The patient has received an after-visit summary and questions were answered concerning future plans. I have discussed medication side effects and warnings with the patient as well. Warning signs for the above conditions were discussed including when to call our office or go to the emergency room. The nurse provided the patient and/or family with advanced directive information if needed and encouraged the patient to provide a copy to the office when available.

## 2019-03-13 ENCOUNTER — TELEPHONE (OUTPATIENT)
Dept: INTERNAL MEDICINE CLINIC | Facility: CLINIC | Age: 44
End: 2019-03-13

## 2019-03-13 ENCOUNTER — OFFICE VISIT (OUTPATIENT)
Dept: INTERNAL MEDICINE CLINIC | Facility: CLINIC | Age: 44
End: 2019-03-13

## 2019-03-13 VITALS
TEMPERATURE: 98.4 F | HEIGHT: 64 IN | HEART RATE: 70 BPM | BODY MASS INDEX: 36.26 KG/M2 | SYSTOLIC BLOOD PRESSURE: 106 MMHG | WEIGHT: 212.4 LBS | DIASTOLIC BLOOD PRESSURE: 74 MMHG | RESPIRATION RATE: 18 BRPM | OXYGEN SATURATION: 98 %

## 2019-03-13 DIAGNOSIS — R22.0 MASS OF OCCIPITAL REGION: Primary | ICD-10-CM

## 2019-03-13 DIAGNOSIS — Z00.00 WELL ADULT EXAM: Primary | ICD-10-CM

## 2019-03-13 DIAGNOSIS — E55.9 VITAMIN D DEFICIENCY: ICD-10-CM

## 2019-03-13 RX ORDER — BUTALBITAL, ACETAMINOPHEN AND CAFFEINE 50; 325; 40 MG/1; MG/1; MG/1
1 TABLET ORAL
COMMUNITY
Start: 2019-03-06 | End: 2019-03-13

## 2019-03-13 NOTE — PROGRESS NOTES
Binu Islas  Identified pt with two pt identifiers(name and ). Chief Complaint   Patient presents with    Follow-up     ER [facility] 3/6/19 bump on back of head, US done Priyanka Short       Reviewed record In preparation for visit and have obtained necessary documentation. 1. Have you been to the ER, urgent care clinic or hospitalized since your last visit? ER 3/6     2. Have you seen or consulted any other health care providers outside of the [address] [place] [address] since your last visit? Include any pap smears or colon screening. No    Vitals reviewed with provider.     Health Maintenance reviewed:     Health Maintenance Due   Topic    PAP AKA CERVICAL CYTOLOGY           Wt Readings from Last 3 Encounters:   19 212 lb 6.4 oz (96.3 kg)   18 237 lb 12.8 oz (107.9 kg)   17 238 lb 6.4 oz (108.1 kg)        Temp Readings from Last 3 Encounters:   19 98.4 °F (36.9 °C) (Oral)   18 99 °F (37.2 °C) (Oral)   17 98.4 °F (36.9 °C) (Oral)        BP Readings from Last 3 Encounters:   19 106/74   18 136/90   17 114/76        Pulse Readings from Last 3 Encounters:   19 70   18 72   17 78        Vitals:    19 1131   BP: 106/74   Pulse: 70   Resp: 18   Temp: 98.4 °F (36.9 °C)   TempSrc: Oral   SpO2: 98%   Weight: 212 lb 6.4 oz (96.3 kg)   Height: 5' 4\" (1.626 m)   PainSc:   0 - No pain   LMP: 2019          Learning Assessment:   :       Learning Assessment 2018   PRIMARY LEARNER Patient   HIGHEST LEVEL OF EDUCATION - PRIMARY LEARNER  4 YEARS OF COLLEGE   BARRIERS PRIMARY LEARNER NONE   PRIMARY LANGUAGE ENGLISH   LEARNER PREFERENCE PRIMARY VIDEOS   LEARNING SPECIAL TOPICS no   ANSWERED BY self   RELATIONSHIP SELF        Depression Screening:   :       3 most recent PHQ Screens 3/13/2019   Little interest or pleasure in doing things Not at all   Feeling down, depressed, irritable, or hopeless Not at all   Total Score PHQ 2 0 Fall Risk Assessment:   :     No flowsheet data found. Abuse Screening:   :     No flowsheet data found. ADL Screening:   :     No flowsheet data found.

## 2019-03-13 NOTE — TELEPHONE ENCOUNTER
----- Message from Cece Simmons sent at 3/13/2019 12:13 PM EDT -----  Regarding: Fasting Labs  Contact: [phone]  Pt would like to get fasting labs prior to her April 19th appt. No orders in system.

## 2019-04-12 LAB
25(OH)D3+25(OH)D2 SERPL-MCNC: 28 NG/ML (ref 30–100)
ALBUMIN SERPL-MCNC: 4.3 G/DL (ref 3.5–5.5)
ALBUMIN/GLOB SERPL: 1.6 {RATIO} (ref 1.2–2.2)
ALP SERPL-CCNC: 60 IU/L (ref 39–117)
ALT SERPL-CCNC: 14 IU/L (ref 0–32)
AST SERPL-CCNC: 13 IU/L (ref 0–40)
BILIRUB SERPL-MCNC: 0.5 MG/DL (ref 0–1.2)
BUN SERPL-MCNC: 18 MG/DL (ref 6–24)
BUN/CREAT SERPL: 21 (ref 9–23)
CALCIUM SERPL-MCNC: 9.4 MG/DL (ref 8.7–10.2)
CHLORIDE SERPL-SCNC: 104 MMOL/L (ref 96–106)
CHOLEST SERPL-MCNC: 161 MG/DL (ref 100–199)
CO2 SERPL-SCNC: 24 MMOL/L (ref 20–29)
CREAT SERPL-MCNC: 0.87 MG/DL (ref 0.57–1)
GLOBULIN SER CALC-MCNC: 2.7 G/DL (ref 1.5–4.5)
GLUCOSE SERPL-MCNC: 83 MG/DL (ref 65–99)
HDLC SERPL-MCNC: 61 MG/DL
LDLC SERPL CALC-MCNC: 91 MG/DL (ref 0–99)
POTASSIUM SERPL-SCNC: 4.7 MMOL/L (ref 3.5–5.2)
PROT SERPL-MCNC: 7 G/DL (ref 6–8.5)
SODIUM SERPL-SCNC: 139 MMOL/L (ref 134–144)
TRIGL SERPL-MCNC: 46 MG/DL (ref 0–149)
VLDLC SERPL CALC-MCNC: 9 MG/DL (ref 5–40)

## 2019-04-18 ENCOUNTER — OFFICE VISIT (OUTPATIENT)
Dept: INTERNAL MEDICINE CLINIC | Facility: CLINIC | Age: 44
End: 2019-04-18

## 2019-04-18 VITALS
OXYGEN SATURATION: 98 % | TEMPERATURE: 98.5 F | HEART RATE: 60 BPM | HEIGHT: 64 IN | SYSTOLIC BLOOD PRESSURE: 109 MMHG | DIASTOLIC BLOOD PRESSURE: 68 MMHG | RESPIRATION RATE: 16 BRPM | BODY MASS INDEX: 35.2 KG/M2 | WEIGHT: 206.2 LBS

## 2019-04-18 DIAGNOSIS — R22.1 NECK MASS: ICD-10-CM

## 2019-04-18 DIAGNOSIS — Z98.84 HISTORY OF LAPAROSCOPIC ADJUSTABLE GASTRIC BANDING: ICD-10-CM

## 2019-04-18 DIAGNOSIS — E55.9 VITAMIN D DEFICIENCY: ICD-10-CM

## 2019-04-18 DIAGNOSIS — Z00.00 WELL ADULT EXAM: Primary | ICD-10-CM

## 2019-04-18 PROBLEM — E66.01 SEVERE OBESITY (HCC): Status: ACTIVE | Noted: 2019-04-18

## 2019-04-18 NOTE — PROGRESS NOTES
Yoel Farias  Identified pt with two pt identifiers(name and ). Chief Complaint Patient presents with  Physical  
  Room . Have you been to the ER, urgent care clinic since your last visit? Hospitalized since your last visit? NO 
 
2. Have you seen or consulted any other health care providers outside of the [address] [place] [address] since your last visit? Include any pap smears or colon screening. NO Provider notified of reason for visit, vitals and flowsheets obtained on patients. Patient received paperwork for advance directive during previous visit but has not completed at this time Reviewed record In preparation for visit, huddled with provider and have obtained necessary documentation Health Maintenance Due Topic  PAP AKA CERVICAL CYTOLOGY Wt Readings from Last 3 Encounters:  
19 212 lb 6.4 oz (96.3 kg) 18 237 lb 12.8 oz (107.9 kg) 17 238 lb 6.4 oz (108.1 kg) Temp Readings from Last 3 Encounters:  
19 98.4 °F (36.9 °C) (Oral) 18 99 °F (37.2 °C) (Oral) 17 98.4 °F (36.9 °C) (Oral) BP Readings from Last 3 Encounters:  
19 106/74  
18 136/90  
17 114/76 Pulse Readings from Last 3 Encounters:  
19 70  
18 72  
17 78 There were no vitals filed for this visit. Learning Assessment: 
:  
 
Learning Assessment 2018 PRIMARY LEARNER Patient HIGHEST LEVEL OF EDUCATION - PRIMARY LEARNER  4 YEARS OF COLLEGE  
BARRIERS PRIMARY LEARNER NONE PRIMARY LANGUAGE ENGLISH  
LEARNER PREFERENCE PRIMARY VIDEOS  
LEARNING SPECIAL TOPICS no  
ANSWERED BY self RELATIONSHIP SELF Depression Screening: 
:  
 
3 most recent PHQ Screens 2019 Little interest or pleasure in doing things Not at all Feeling down, depressed, irritable, or hopeless Not at all Total Score PHQ 2 0 Fall Risk Assessment: 
:  
 
Fall Risk Assessment, last 12 mths 2019 Able to walk? Yes Fall in past 12 months? No  
 
 
Abuse Screening: 
:  
 
Abuse Screening Questionnaire 4/18/2019 Do you ever feel afraid of your partner? [place] Are you in a relationship with someone who physically or mentally threatens you? [place] Is it safe for you to go home? Y  
 
 
ADL Screening: 
:  
 
ADL Assessment 4/18/2019 Feeding yourself No Help Needed Getting from bed to chair No Help Needed Getting dressed No Help Needed Bathing or showering No Help Needed Walk across the room (includes cane/walker) No Help Needed Using the telphone No Help Needed Taking your medications No Help Needed Preparing meals No Help Needed Managing money (expenses/bills) No Help Needed Moderately strenuous housework (laundry) No Help Needed Shopping for personal items (toiletries/medicines) No Help Needed Shopping for groceries No Help Needed Driving No Help Needed Climbing a flight of stairs No Help Needed Getting to places beyond walking distances No Help Needed Medication reconciliation up to date and corrected with patient at this time.

## 2019-04-18 NOTE — PROGRESS NOTES
HPI Ms. Matheus Escobar is a 41y.o. year old female, she is seen today for well exam. Has been well except for mass base of skull, had US showing fluid collection. Records in chart and reviewed. By the time she was seen by MEI Shah was significantly smaller and not painful - was extremely painful and not able to move head without pain at the time it was initially noted. No chest pain, sob, dizziness, weakness - at the time couldn't even comb her hair due to pain. No other lumps or bumps. Running 3-4 miles 35-45 min 3x per week - using couch to [employer]. Has had 29# weight loss in a year. Working 80+ hours per week. Her office has a gym she uses. Eats much less carbs b/c painful to eat s/p gastric sleeve.  
12 y/o son at home Takes 2 hours to get work in morning - 1 hour getting home. Chief Complaint Patient presents with  Physical  
  Room 2B// no appt w/ gen Surgery scheduled as oreder by MEI Shah // Rupesh Sanders and [facility] Prior to Admission medications Not on File Allergies Allergen Reactions  Hydrocodone Nausea and Vomiting REVIEW OF SYSTEMS: 
Per HPI PHYSICAL EXAM: 
Visit Vitals /68 (BP 1 Location: Left arm, BP Patient Position: Sitting) Pulse 60 Temp 98.5 °F (36.9 °C) (Oral) Resp 16 Ht 5' 4\" (1.626 m) Wt 206 lb 3.2 oz (93.5 kg) LMP 04/01/2019 (Exact Date) SpO2 98% BMI 35.39 kg/m² Constitutional: Appears well-developed and well-nourished. No distress. HENT:  
Head: Normocephalic and atraumatic. approx 1cm mobile nodule base of skull on right occiput region Eyes: No scleral icterus. Neck: no lad, no tm, supple Cardiovascular: Normal S1/S2, regular rhythm. No murmurs, rubs, or gallops. Pulmonary/Chest: Effort normal and breath sounds normal. No respiratory distress. No wheezes, rhonchi, or rales. Abdomen: Soft, NT/ND, +BS, no rebound or guarding, no masses, no HSM appreciated. Ext: No edema. Neurological: Alert. Psychiatric: Normal mood and affect. Behavior is normal. 
  
Lab Results Component Value Date/Time Sodium 139 04/11/2019 08:50 AM  
 Potassium 4.7 04/11/2019 08:50 AM  
 Chloride 104 04/11/2019 08:50 AM  
 CO2 24 04/11/2019 08:50 AM  
 Anion gap 12 10/26/2010 10:17 AM  
 Glucose 83 04/11/2019 08:50 AM  
 BUN 18 04/11/2019 08:50 AM  
 Creatinine 0.87 04/11/2019 08:50 AM  
 BUN/Creatinine ratio 21 04/11/2019 08:50 AM  
 GFR est AA 96 04/11/2019 08:50 AM  
 GFR est non-AA 83 04/11/2019 08:50 AM  
 Calcium 9.4 04/11/2019 08:50 AM  
 Bilirubin, total 0.5 04/11/2019 08:50 AM  
 AST (SGOT) 13 04/11/2019 08:50 AM  
 Alk. phosphatase 60 04/11/2019 08:50 AM  
 Protein, total 7.0 04/11/2019 08:50 AM  
 Albumin 4.3 04/11/2019 08:50 AM  
 Globulin 3.5 10/26/2010 10:17 AM  
 A-G Ratio 1.6 04/11/2019 08:50 AM  
 ALT (SGPT) 14 04/11/2019 08:50 AM  
 
Lab Results Component Value Date/Time Hemoglobin A1c 5.2 04/04/2017 10:37 AM  
  
Lab Results Component Value Date/Time Cholesterol, total 161 04/11/2019 08:50 AM  
 HDL Cholesterol 61 04/11/2019 08:50 AM  
 LDL, calculated 91 04/11/2019 08:50 AM  
 VLDL, calculated 9 04/11/2019 08:50 AM  
 Triglyceride 46 04/11/2019 08:50 AM  
 CHOL/HDL Ratio 3.4 10/26/2010 10:17 AM  
  
 
 
ASSESSMENT/PLAN Diagnoses and all orders for this visit: 
 
1. Well adult exam 
Lipids excellent - encouraged exercise as she is 2. History of laparoscopic adjustable gastric banding 3. Neck mass 
-     CT NECK SOFT TISSUE W CONT; Future Not clear on US - get CT - if still not clear consider refer gen surgery 4. Vitamin D deficiency Vit d3 1000 IU daily 5. BMI 35.0-35.9,adult Continued diet/exercise changes Health Maintenance Due Topic Date Due  
 PAP AKA CERVICAL CYTOLOGY  04/30/2019 Follow-up and Dispositions · Return in about 1 year (around 4/18/2020), or if symptoms worsen or fail to improve, for well exam. 
  
  
 
 
 Reviewed plan of care. Patient has provided input and agrees with goals. The nurse provided the patient and/or family with advanced directive information if needed and encouraged the patient to provide a copy to the office when available.

## 2019-04-28 PROBLEM — E66.01 SEVERE OBESITY (HCC): Status: RESOLVED | Noted: 2019-04-18 | Resolved: 2019-04-28

## 2019-04-28 PROBLEM — E66.01 SEVERE OBESITY (HCC): Status: ACTIVE | Noted: 2019-04-28

## 2019-05-02 ENCOUNTER — HOSPITAL ENCOUNTER (OUTPATIENT)
Dept: CT IMAGING | Age: 44
Discharge: HOME | End: 2019-05-02
Payer: COMMERCIAL

## 2019-05-02 DIAGNOSIS — R22.1 NECK MASS: ICD-10-CM

## 2019-05-02 PROCEDURE — 70491 CT SOFT TISSUE NECK W/DYE: CPT

## 2019-05-02 RX ORDER — SODIUM CHLORIDE 0.9 % (FLUSH) 0.9 %
10 SYRINGE (ML) INJECTION
Status: COMPLETED | OUTPATIENT
Start: 2019-05-02 | End: 2019-05-02

## 2019-05-02 RX ADMIN — Medication 10 ML: at 12:04

## 2019-05-08 ENCOUNTER — TELEPHONE (OUTPATIENT)
Dept: INTERNAL MEDICINE CLINIC | Facility: CLINIC | Age: 44
End: 2019-05-08

## 2019-05-08 NOTE — TELEPHONE ENCOUNTER
\"Slight ill-defined asymmetric soft tissue prominence at the site of  tenderness/fullness in the left posterior lateral occipital region at the  lateral margin of insertion posterior neck muscles. A discrete mass was not  definitely demonstrated. \"    This means no mass, possibly slight swelling at muscle insertion site in skull. If returns would have her see a surgeon but may have had soft tissue swelling only - no mass identified.

## 2019-05-13 NOTE — TELEPHONE ENCOUNTER
Verified patients name and date of birth. Advised patient of results per Dr Angelique Zapata note. Also advised patient sign up for My Chart for easier communication.

## 2020-02-14 ENCOUNTER — APPOINTMENT (OUTPATIENT)
Age: 45
Setting detail: DERMATOLOGY
End: 2020-02-17

## 2020-02-14 DIAGNOSIS — L73.8 OTHER SPECIFIED FOLLICULAR DISORDERS: ICD-10-CM

## 2020-02-14 DIAGNOSIS — D18.0 HEMANGIOMA: ICD-10-CM

## 2020-02-14 DIAGNOSIS — L81.4 OTHER MELANIN HYPERPIGMENTATION: ICD-10-CM

## 2020-02-14 DIAGNOSIS — Z71.89 OTHER SPECIFIED COUNSELING: ICD-10-CM

## 2020-02-14 DIAGNOSIS — I83.9 ASYMPTOMATIC VARICOSE VEINS OF LOWER EXTREMITIES: ICD-10-CM

## 2020-02-14 DIAGNOSIS — L28.0 LICHEN SIMPLEX CHRONICUS: ICD-10-CM

## 2020-02-14 DIAGNOSIS — Z80.8 FAMILY HISTORY OF MALIGNANT NEOPLASM OF OTHER ORGANS OR SYSTEMS: ICD-10-CM

## 2020-02-14 PROBLEM — D18.01 HEMANGIOMA OF SKIN AND SUBCUTANEOUS TISSUE: Status: ACTIVE | Noted: 2020-02-14

## 2020-02-14 PROBLEM — I83.91 ASYMPTOMATIC VARICOSE VEINS OF RIGHT LOWER EXTREMITY: Status: ACTIVE | Noted: 2020-02-14

## 2020-02-14 PROCEDURE — 99213 OFFICE O/P EST LOW 20 MIN: CPT

## 2020-02-14 PROCEDURE — OTHER MIPS QUALITY: OTHER

## 2020-02-14 PROCEDURE — OTHER COUNSELING: OTHER

## 2020-02-14 PROCEDURE — OTHER PRESCRIPTION: OTHER

## 2020-02-14 PROCEDURE — OTHER REASSURANCE: OTHER

## 2020-02-14 RX ORDER — TRIAMCINOLONE ACETONIDE 1 MG/G
CREAM TOPICAL BID
Qty: 1 | Refills: 0 | Status: ERX | COMMUNITY
Start: 2020-02-14

## 2020-02-14 ASSESSMENT — SEVERITY ASSESSMENT: SEVERITY: MILD

## 2020-02-14 ASSESSMENT — BSA RASH: BSA RASH: 2

## 2020-02-14 ASSESSMENT — LOCATION SIMPLE DESCRIPTION DERM
LOCATION SIMPLE: LEFT UPPER BACK
LOCATION SIMPLE: LEFT PRETIBIAL REGION
LOCATION SIMPLE: LEFT HAND
LOCATION SIMPLE: RIGHT LOWER BACK
LOCATION SIMPLE: RIGHT PRETIBIAL REGION
LOCATION SIMPLE: RIGHT HAND
LOCATION SIMPLE: RIGHT KNEE

## 2020-02-14 ASSESSMENT — LOCATION ZONE DERM
LOCATION ZONE: TRUNK
LOCATION ZONE: LEG
LOCATION ZONE: HAND

## 2020-02-14 ASSESSMENT — LOCATION DETAILED DESCRIPTION DERM
LOCATION DETAILED: LEFT MEDIAL UPPER BACK
LOCATION DETAILED: LEFT RADIAL DORSAL HAND
LOCATION DETAILED: RIGHT PROXIMAL PRETIBIAL REGION
LOCATION DETAILED: RIGHT MEDIAL KNEE
LOCATION DETAILED: RIGHT INFERIOR LATERAL LOWER BACK
LOCATION DETAILED: LEFT PROXIMAL PRETIBIAL REGION
LOCATION DETAILED: RIGHT RADIAL DORSAL HAND

## 2020-02-14 NOTE — PROCEDURE: REASSURANCE
Detail Level: Generalized
Include Location In Plan?: No
Detail Level: Simple
Include Location In Plan?: Yes
Detail Level: Zone

## 2020-03-03 ENCOUNTER — OFFICE VISIT (OUTPATIENT)
Dept: INTERNAL MEDICINE CLINIC | Facility: CLINIC | Age: 45
End: 2020-03-03

## 2020-03-03 VITALS
SYSTOLIC BLOOD PRESSURE: 117 MMHG | BODY MASS INDEX: 38.79 KG/M2 | HEIGHT: 64 IN | DIASTOLIC BLOOD PRESSURE: 87 MMHG | WEIGHT: 227.2 LBS | RESPIRATION RATE: 16 BRPM | TEMPERATURE: 99.3 F | OXYGEN SATURATION: 97 % | HEART RATE: 87 BPM

## 2020-03-03 DIAGNOSIS — J06.9 VIRAL URI WITH COUGH: Primary | ICD-10-CM

## 2020-03-03 DIAGNOSIS — R68.83 CHILLS: ICD-10-CM

## 2020-03-03 LAB
FLUAV+FLUBV AG NOSE QL IA.RAPID: NEGATIVE POS/NEG
FLUAV+FLUBV AG NOSE QL IA.RAPID: NEGATIVE POS/NEG
VALID INTERNAL CONTROL?: YES

## 2020-03-03 NOTE — PATIENT INSTRUCTIONS

## 2020-03-03 NOTE — PROGRESS NOTES
Digna Arroyo  Identified pt with two pt identifiers(name and ). Chief Complaint   Patient presents with    Cough     Room 3C // Chills // Body Aches // since yesterday    Fever       Reviewed record In preparation for visit and have obtained necessary documentation. 1. Have you been to the ER, urgent care clinic or hospitalized since your last visit? No     2. Have you seen or consulted any other health care providers outside of the [address] [place] [address] since your last visit? Include any pap smears or colon screening. No    Patient declined receiving information on advance directives. Vitals reviewed with provider. Health Maintenance reviewed:     Health Maintenance Due   Topic    Influenza Age 5 to Adult    raid  Wt Readings from Last 3 Encounters:   20 227 lb 3.2 oz (103.1 kg)   19 206 lb 3.2 oz (93.5 kg)   19 212 lb 6.4 oz (96.3 kg)    of your partner? N   Are you in a relationship with someone who physically or mentally threatens you? N   Is it safe for you to go home? Y            Temp Readings from Last 3 Encounters:   20 99.3 °F (37.4 °C) (Oral)   19 98.5 °F (36.9 °C) (Oral)   19 98.4 °F (36.9 °C) (Oral)                                                  Climbing a flig  Learning Assessment 2018   PRIMARY LEARNER Patient   HIGHEST LEVEL OF EDUCATION - PRIMARY LEARNER  4 YEARS OF COLLEGE   BARRIERS PRIMARY LEARNER NONE   PRIMARY LANGUAGE ENGLISH   LEARNER PREFERENCE PRIMARY VIDEOS   LEARNING SPECIAL TOPICS no   ANSWERED BY self   RELATIONSHIP SELF   to go home?  Y     Fall Risk Ass  3 most recent PHQ Screens 3/3/2020   Little interest or pleasure in doing things Not at all   Feeling down, depressed, irritable, or hopeless Not at all   Total Score PHQ 2 0

## 2020-03-03 NOTE — LETTER
NOTIFICATION RETURN TO WORK / SCHOOL 
 
3/3/2020 3:11 PM 
 
Ms. Mariel Marr [address] [facility] [place] [address] To Whom It May Concern: 
 
Mariel Marr is currently under the care of [address] [place] [address]. Please excuse her for missing work on 3/3/2020. She will return to work/school on: Wednesday, 3/4/2020 but should be allowed to work from home on that date. If there are questions or concerns please have the patient contact our office. Sincerely, Airam Clancy MD

## 2020-03-03 NOTE — PROGRESS NOTES
CC:   Chief Complaint   Patient presents with    Cough     Room 3C // Chills // Body Aches // since yesterday    Fever       HISTORY OF PRESENT ILLNESS  Priyanka Zamora is a 45 y.o. female. Patient complains of body aches, fevers, chills, nonproductive cough, and fatigue that started yesterday. Denies sore throat, headaches, sinus congestion, ear pains, dyspnea, wheezing, hemoptysis, chest pain, abdominal pain, or diarrhea. Treatment to date: [place] Cold Plus with some relief. Patient was exposed to her 10year old son who had a bacterial ear and sinus infection. She did not receive a flu vaccine. Patient Active Problem List   Diagnosis Code    Obesity E66.9    Enlarged lymph nodes R59.9    Carpal tunnel syndrome on both sides G56.03    History of laparoscopic adjustable gastric banding Z98.84    Severe obesity ([address] E66.01     Past Medical History:   Diagnosis Date    History of laparoscopic adjustable gastric banding 01/03/2010     Allergies   Allergen Reactions    Hydrocodone Nausea and Vomiting         PHYSICAL EXAM  Visit Vitals  /87 (BP 1 Location: Left arm, BP Patient Position: Sitting)   Pulse 87   Temp 99.3 °F (37.4 °C) (Oral)   Resp 16   Ht 5' 4\" (1.626 m)   Wt 227 lb 3.2 oz (103.1 kg)   LMP 02/17/2020 (Approximate)   SpO2 97%   BMI 39.00 kg/m²       General: Obese, no distress. Coughs occasionally. HEENT:  Head normocephalic/atraumatic, no scleral icterus or conjunctival injection. Nasal mucosa normal. Oropharynx benign. No sinus tenderness. TM's and ear canals normal bilaterally. Neck: Supple. No lymphadenopathy. Lungs:  Clear to ausculation bilaterally. Good air movement. Able to talk in complete sentences. No accessory respiratory muscle use. Heart:  Regular rate and rhythm, normal S1 and S2, no murmur, gallop, or rub  Neurological: Alert and oriented. Psychiatric: Normal mood and affect.  Behavior is normal.     Results for orders placed or performed in visit on 03/03/20   AMB POC RAPID INFLUENZA TEST   Result Value Ref Range    VALID INTERNAL CONTROL POC Yes     Influenza A Ag POC Negative Negative Pos/Neg    Influenza B Ag POC Negative Negative Pos/Neg           ASSESSMENT AND PLAN    ICD-10-CM ICD-9-CM    1. Viral URI with cough J06.9 465.9     B97.89     2. Chills R68.83 780.64 AMB POC RAPID INFLUENZA TEST     Diagnoses and all orders for this visit:    1. Viral URI with cough  Continue conservative management with OTC cold medications, increased fluids and rest.  Work excuse note written. 2. Chills  Negative rapid flu test.  -     AMB POC RAPID INFLUENZA TEST      Follow-up and Dispositions    · Return if symptoms worsen or fail to improve. I have discussed the diagnosis with the patient and the intended plan as seen in the above orders. Patient is in agreement. The patient has received an after-visit summary and questions were answered concerning future plans. I have discussed medication side effects and warnings with the patient as well.

## 2021-02-08 ENCOUNTER — APPOINTMENT (OUTPATIENT)
Age: 46
Setting detail: DERMATOLOGY
End: 2021-02-08

## 2021-02-08 DIAGNOSIS — D18.0 HEMANGIOMA: ICD-10-CM

## 2021-02-08 DIAGNOSIS — Z71.89 OTHER SPECIFIED COUNSELING: ICD-10-CM

## 2021-02-08 DIAGNOSIS — L30.9 DERMATITIS, UNSPECIFIED: ICD-10-CM

## 2021-02-08 DIAGNOSIS — L81.4 OTHER MELANIN HYPERPIGMENTATION: ICD-10-CM

## 2021-02-08 DIAGNOSIS — Z80.8 FAMILY HISTORY OF MALIGNANT NEOPLASM OF OTHER ORGANS OR SYSTEMS: ICD-10-CM

## 2021-02-08 PROBLEM — D18.01 HEMANGIOMA OF SKIN AND SUBCUTANEOUS TISSUE: Status: ACTIVE | Noted: 2021-02-08

## 2021-02-08 PROCEDURE — OTHER MIPS QUALITY: OTHER

## 2021-02-08 PROCEDURE — OTHER BIOPSY BY SHAVE METHOD: OTHER

## 2021-02-08 PROCEDURE — OTHER PRESCRIPTION: OTHER

## 2021-02-08 PROCEDURE — 11102 TANGNTL BX SKIN SINGLE LES: CPT

## 2021-02-08 PROCEDURE — OTHER REASSURANCE: OTHER

## 2021-02-08 PROCEDURE — 99213 OFFICE O/P EST LOW 20 MIN: CPT | Mod: 25

## 2021-02-08 PROCEDURE — OTHER COUNSELING: OTHER

## 2021-02-08 RX ORDER — CLOBETASOL PROPIONATE 0.5 MG/G
OINTMENT TOPICAL
Qty: 1 | Refills: 0 | Status: ERX | COMMUNITY
Start: 2021-02-08

## 2021-02-08 ASSESSMENT — LOCATION DETAILED DESCRIPTION DERM
LOCATION DETAILED: RIGHT INFERIOR LATERAL LOWER BACK
LOCATION DETAILED: RIGHT DISTAL PRETIBIAL REGION
LOCATION DETAILED: RIGHT RADIAL DORSAL HAND
LOCATION DETAILED: LEFT DISTAL PRETIBIAL REGION
LOCATION DETAILED: LEFT RADIAL DORSAL HAND

## 2021-02-08 ASSESSMENT — LOCATION SIMPLE DESCRIPTION DERM
LOCATION SIMPLE: LEFT HAND
LOCATION SIMPLE: RIGHT PRETIBIAL REGION
LOCATION SIMPLE: RIGHT HAND
LOCATION SIMPLE: LEFT PRETIBIAL REGION
LOCATION SIMPLE: RIGHT LOWER BACK

## 2021-02-08 ASSESSMENT — LOCATION ZONE DERM
LOCATION ZONE: HAND
LOCATION ZONE: TRUNK
LOCATION ZONE: LEG

## 2021-02-08 ASSESSMENT — BSA RASH: BSA RASH: 3

## 2021-02-08 ASSESSMENT — SEVERITY ASSESSMENT: SEVERITY: MILD TO MODERATE

## 2021-02-08 NOTE — PROCEDURE: MIPS QUALITY
Detail Level: Detailed
Additional Notes: Due to COVID-19, at today’s office visit we utilized face masks, wipes, and other additional supplies, materials, and clinical staff time over and above those usually included in an office visit, which was performed during the Coronavirus-related Public Health Emergency.
Quality 226: Preventive Care And Screening: Tobacco Use: Screening And Cessation Intervention: Patient screened for tobacco use and is an ex/non-smoker
Quality 130: Documentation Of Current Medications In The Medical Record: Current Medications Documented
Quality 110: Preventive Care And Screening: Influenza Immunization: Influenza Immunization Administered during Influenza season
Quality 431: Preventive Care And Screening: Unhealthy Alcohol Use - Screening: Patient screened for unhealthy alcohol use using a single question and scores less than 2 times per year

## 2021-02-08 NOTE — PROCEDURE: BIOPSY BY SHAVE METHOD
Detail Level: Detailed
Validate Lesion Size: No
Size Of Lesion In Cm: 0
Was A Bandage Applied: Yes
Electrodesiccation And Curettage Text: The wound bed was treated with electrodesiccation and curettage after the biopsy was performed.
Anesthesia Volume In Cc (Will Not Render If 0): 0.5
Consent: Written consent was obtained and risks were reviewed including but not limited to scarring, infection, bleeding, scabbing, incomplete removal, nerve damage and allergy to anesthesia.
Billing Type: Third-Party Bill
Depth Of Biopsy: dermis
Information: Selecting Yes will display possible errors in your note based on the variables you have selected. This validation is only offered as a suggestion for you. PLEASE NOTE THAT THE VALIDATION TEXT WILL BE REMOVED WHEN YOU FINALIZE YOUR NOTE. IF YOU WANT TO FAX A PRELIMINARY NOTE YOU WILL NEED TO TOGGLE THIS TO 'NO' IF YOU DO NOT WANT IT IN YOUR FAXED NOTE.
Type Of Destruction Used: Curettage
Electrodesiccation Text: The wound bed was treated with electrodesiccation after the biopsy was performed.
Notification Instructions: Patient will be notified of biopsy results. However, patient instructed to call the office if not contacted within 2 weeks.
Post-Care Instructions: I reviewed with the patient in detail post-care instructions. Patient is to keep the biopsy site dry overnight, and then apply bacitracin twice daily until healed. Patient may apply hydrogen peroxide soaks to remove any crusting.
Wound Care: Petrolatum
Dressing: bandage
Hemostasis: Aluminum Chloride
Biopsy Type: H and E
Anesthesia Type: 1% lidocaine with epinephrine
Biopsy Method: Personna blade
Cryotherapy Text: The wound bed was treated with cryotherapy after the biopsy was performed.
Curettage Text: The wound bed was treated with curettage after the biopsy was performed.
Silver Nitrate Text: The wound bed was treated with silver nitrate after the biopsy was performed.

## 2021-02-17 ENCOUNTER — OFFICE VISIT (OUTPATIENT)
Dept: INTERNAL MEDICINE CLINIC | Age: 46
End: 2021-02-17
Payer: COMMERCIAL

## 2021-02-17 VITALS
HEART RATE: 60 BPM | WEIGHT: 250.8 LBS | HEIGHT: 64 IN | RESPIRATION RATE: 16 BRPM | TEMPERATURE: 98 F | BODY MASS INDEX: 42.82 KG/M2 | SYSTOLIC BLOOD PRESSURE: 109 MMHG | OXYGEN SATURATION: 99 % | DIASTOLIC BLOOD PRESSURE: 74 MMHG

## 2021-02-17 DIAGNOSIS — Z00.00 WELL ADULT EXAM: Primary | ICD-10-CM

## 2021-02-17 DIAGNOSIS — G56.03 CARPAL TUNNEL SYNDROME ON BOTH SIDES: ICD-10-CM

## 2021-02-17 DIAGNOSIS — Z98.84 HISTORY OF LAPAROSCOPIC ADJUSTABLE GASTRIC BANDING: ICD-10-CM

## 2021-02-17 DIAGNOSIS — E55.9 VITAMIN D DEFICIENCY: ICD-10-CM

## 2021-02-17 PROCEDURE — 99396 PREV VISIT EST AGE 40-64: CPT | Performed by: INTERNAL MEDICINE

## 2021-02-17 RX ORDER — CLOBETASOL PROPIONATE 0.5 MG/G
OINTMENT TOPICAL
COMMUNITY
Start: 2021-02-08

## 2021-02-17 NOTE — PROGRESS NOTES
HPI  Ms. Shashi Keller is a 39y.o. year old female, she is seen today for well exam. Has been working from home which has been stressful. Has a treadmill    No chest pain, sob, dizziness, weakness. No n/v/abd pain, melena or brbpr.  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 Gyn is up to date. Has had numbness in fingers - 1st 3 fingers right hand are the worst. Wakes her with pain and tingling in right hand sometimes up her arm.  strength not as good right hand. Has to shake hands out to improve sensation. Left hand not as severe. Also happens when she drives. Tried wrist splints but didn't help. Had adjustment to lap band, having fluid removed from band. Chief Complaint   Patient presents with    Physical    Numbness     Fingers, ongoing for years. .         Prior to Admission medications    Medication Sig Start Date End Date Taking? Authorizing Provider   clobetasoL (TEMOVATE) 0.05 % ointment APPLY TO RASH ON LEGS TWICE DAILY X 2 WEEKS 2/8/21  Yes Provider, Historical         Allergies   Allergen Reactions    Hydrocodone Nausea and Vomiting         REVIEW OF SYSTEMS:  Per HPI    PHYSICAL EXAM:  Visit Vitals  /74 (BP 1 Location: Right arm, BP Patient Position: Sitting, BP Cuff Size: Large adult)   Pulse 60   Temp 98 °F (36.7 °C) (Oral)   Resp 16   Ht 5' 4\" (1.626 m)   Wt 250 lb 12.8 oz (113.8 kg)   SpO2 99%   BMI 43.05 kg/m²     Constitutional: Appears well-developed and well-nourished. No distress. HENT:   Head: Normocephalic and atraumatic. Eyes: No scleral icterus. Neck: no lad, no tm, supple   Cardiovascular: Normal S1/S2, regular rhythm. No murmurs, rubs, or gallops. Pulmonary/Chest: Effort normal and breath sounds normal. No respiratory distress. No wheezes, rhonchi, or rales. Abdomen: Soft, NT/ND, +BS, no rebound or guarding, no masses, no HSM appreciated. Wrists: phalen's positive both wrists as well as tinel's   Ext: No edema. Neurological: Alert. Strength 5/5 b/l UE   Psychiatric: Normal mood and affect. Behavior is normal.     Lab Results   Component Value Date/Time    Sodium 139 04/11/2019 08:50 AM    Potassium 4.7 04/11/2019 08:50 AM    Chloride 104 04/11/2019 08:50 AM    CO2 24 04/11/2019 08:50 AM    Anion gap 12 10/26/2010 10:17 AM    Glucose 83 04/11/2019 08:50 AM    BUN 18 04/11/2019 08:50 AM    Creatinine 0.87 04/11/2019 08:50 AM    BUN/Creatinine ratio 21 04/11/2019 08:50 AM    GFR est AA 96 04/11/2019 08:50 AM    GFR est non-AA 83 04/11/2019 08:50 AM    Calcium 9.4 04/11/2019 08:50 AM    Bilirubin, total 0.5 04/11/2019 08:50 AM    Alk. phosphatase 60 04/11/2019 08:50 AM    Protein, total 7.0 04/11/2019 08:50 AM    Albumin 4.3 04/11/2019 08:50 AM    Globulin 3.5 10/26/2010 10:17 AM    A-G Ratio 1.6 04/11/2019 08:50 AM    ALT (SGPT) 14 04/11/2019 08:50 AM     Lab Results   Component Value Date/Time    Hemoglobin A1c 5.2 04/04/2017 10:37 AM      Lab Results   Component Value Date/Time    Cholesterol, total 161 04/11/2019 08:50 AM    HDL Cholesterol 61 04/11/2019 08:50 AM    LDL, calculated 91 04/11/2019 08:50 AM    VLDL, calculated 9 04/11/2019 08:50 AM    Triglyceride 46 04/11/2019 08:50 AM    CHOL/HDL Ratio 3.4 10/26/2010 10:17 AM          ASSESSMENT/PLAN  Diagnoses and all orders for this visit:    1. Well adult exam  -     METABOLIC PANEL, COMPREHENSIVE; Future  -     LIPID PANEL; Future  -     TSH AND FREE T4; Future  Add exercise as able  2. Carpal tunnel syndrome on both sides  -     REFERRAL TO ORTHOPEDICS  Getting worse  3. History of laparoscopic adjustable gastric banding  Had fluid removed from band, considering gastric sleeve  4.  Vitamin D deficiency  -     VITAMIN D, 25 HYDROXY; Future      If stress worsens will call back and would consider medications such as ssri or buspar    Health Maintenance Due   Topic Date Due    COVID-19 Vaccine (1 of 2) 06/22/1993    PAP AKA CERVICAL CYTOLOGY  05/11/2021 Follow-up and Dispositions    · Return in about 1 year (around 2/17/2022), or if symptoms worsen or fail to improve, for well exam.            Reviewed plan of care. Patient has provided input and agrees with goals. The nurse provided the patient and/or family with advanced directive information if needed and encouraged the patient to provide a copy to the office when available.

## 2021-02-17 NOTE — PROGRESS NOTES
Peter Castro  Identified pt with two pt identifiers(name and ). Chief Complaint   Patient presents with    Physical    Numbness     Fingers, ongoing for years. .        Reviewed record In preparation for visit and have obtained necessary documentation. 1. Have you been to the ER, urgent care clinic or hospitalized since your last visit? No     2. Have you seen or consulted any other health care providers outside of the [address] [place] [address] since your last visit? Include any pap smears or colon screening. No    Patient does not have an advance directive. Vitals reviewed with provider.     Health Maintenance reviewed:     Health Maintenance Due   Topic    COVID-19 Vaccine (1 of 2)    PAP AKA CERVICAL CYTOLOGY           Wt Readings from Last 3 Encounters:   21 250 lb 12.8 oz (113.8 kg)   20 227 lb 3.2 oz (103.1 kg)   19 206 lb 3.2 oz (93.5 kg)        Temp Readings from Last 3 Encounters:   21 98 °F (36.7 °C) (Oral)   20 99.3 °F (37.4 °C) (Oral)   19 98.5 °F (36.9 °C) (Oral)        BP Readings from Last 3 Encounters:   21 109/74   20 117/87   19 109/68        Pulse Readings from Last 3 Encounters:   21 60   20 87   19 60        Vitals:    21 0852   BP: 109/74   Pulse: 60   Resp: 16   Temp: 98 °F (36.7 °C)   TempSrc: Oral   SpO2: 99%   Weight: 250 lb 12.8 oz (113.8 kg)   Height: 5' 4\" (1.626 m)   PainSc:   0 - No pain          Learning Assessment:   :       Learning Assessment 2018   PRIMARY LEARNER Patient   HIGHEST LEVEL OF EDUCATION - PRIMARY LEARNER  4 YEARS OF COLLEGE   BARRIERS PRIMARY LEARNER NONE   PRIMARY LANGUAGE ENGLISH   LEARNER PREFERENCE PRIMARY VIDEOS   LEARNING SPECIAL TOPICS no   ANSWERED BY self   RELATIONSHIP SELF        Depression Screening:   :       3 most recent PHQ Screens 2021   Little interest or pleasure in doing things Not at all   Feeling down, depressed, irritable, or hopeless Several days   Total Score PHQ 2 1        Fall Risk Assessment:   :       Fall Risk Assessment, last 12 mths 4/18/2019   Able to walk? Yes   Fall in past 12 months? No        Abuse Screening:   :       Abuse Screening Questionnaire 4/18/2019   Do you ever feel afraid of your partner? N   Are you in a relationship with someone who physically or mentally threatens you? N   Is it safe for you to go home?  Y        ADL Screening:   :       ADL Assessment 4/18/2019   Feeding yourself No Help Needed   Getting from bed to chair No Help Needed   Getting dressed No Help Needed   Bathing or showering No Help Needed   Walk across the room (includes cane/walker) No Help Needed   Using the telphone No Help Needed   Taking your medications No Help Needed   Preparing meals No Help Needed   Managing money (expenses/bills) No Help Needed   Moderately strenuous housework (laundry) No Help Needed   Shopping for personal items (toiletries/medicines) No Help Needed   Shopping for groceries No Help Needed   Driving No Help Needed   Climbing a flight of stairs No Help Needed   Getting to places beyond walking distances No Help Needed

## 2021-02-17 NOTE — LETTER
3/24/2021 11:15 AM 
 
Ms. Jazzmine Gonzales [address] [address] Results for orders placed or performed in visit on 02/17/21 TSH AND FREE T4 Result Value Ref Range TSH 1.970 0.450 - 4.500 uIU/mL T4, Free 1.17 0.82 - 1.77 ng/dL METABOLIC PANEL, COMPREHENSIVE Result Value Ref Range Glucose 95 65 - 99 mg/dL BUN 21 6 - 24 mg/dL Creatinine 0.94 0.57 - 1.00 mg/dL GFR est non-AA 75 >59 mL/min/1.73 GFR est AA 86 >59 mL/min/1.73  
 BUN/Creatinine ratio 22 9 - 23 Sodium 142 134 - 144 mmol/L Potassium 4.5 3.5 - 5.2 mmol/L Chloride 106 96 - 106 mmol/L  
 CO2 23 20 - 29 mmol/L Calcium 9.1 8.7 - 10.2 mg/dL Protein, total 6.6 6.0 - 8.5 g/dL Albumin 4.3 3.8 - 4.8 g/dL GLOBULIN, TOTAL 2.3 1.5 - 4.5 g/dL A-G Ratio 1.9 1.2 - 2.2 Bilirubin, total 0.4 0.0 - 1.2 mg/dL Alk. phosphatase 73 39 - 117 IU/L  
 AST (SGOT) 17 0 - 40 IU/L  
 ALT (SGPT) 16 0 - 32 IU/L  
LIPID PANEL Result Value Ref Range Cholesterol, total 153 100 - 199 mg/dL Triglyceride 50 0 - 149 mg/dL HDL Cholesterol 65 >39 mg/dL VLDL, calculated 11 5 - 40 mg/dL LDL, calculated 77 0 - 99 mg/dL VITAMIN D, 25 HYDROXY Result Value Ref Range VITAMIN D, 25-HYDROXY 20.1 (L) 30.0 - 100.0 ng/mL RECOMMENDATIONS Cholesterol excellent. Normal kidney, liver, thyroid labs. Vit d low - start vit d3 2000IU daily - mychart message sent Sincerely, Jose F Zhu MD

## 2021-03-19 LAB
25(OH)D3+25(OH)D2 SERPL-MCNC: 20.1 NG/ML (ref 30–100)
ALBUMIN SERPL-MCNC: 4.3 G/DL (ref 3.8–4.8)
ALBUMIN/GLOB SERPL: 1.9 {RATIO} (ref 1.2–2.2)
ALP SERPL-CCNC: 73 IU/L (ref 39–117)
ALT SERPL-CCNC: 16 IU/L (ref 0–32)
AST SERPL-CCNC: 17 IU/L (ref 0–40)
BILIRUB SERPL-MCNC: 0.4 MG/DL (ref 0–1.2)
BUN SERPL-MCNC: 21 MG/DL (ref 6–24)
BUN/CREAT SERPL: 22 (ref 9–23)
CALCIUM SERPL-MCNC: 9.1 MG/DL (ref 8.7–10.2)
CHLORIDE SERPL-SCNC: 106 MMOL/L (ref 96–106)
CHOLEST SERPL-MCNC: 153 MG/DL (ref 100–199)
CO2 SERPL-SCNC: 23 MMOL/L (ref 20–29)
CREAT SERPL-MCNC: 0.94 MG/DL (ref 0.57–1)
GLOBULIN SER CALC-MCNC: 2.3 G/DL (ref 1.5–4.5)
GLUCOSE SERPL-MCNC: 95 MG/DL (ref 65–99)
HDLC SERPL-MCNC: 65 MG/DL
LDLC SERPL CALC-MCNC: 77 MG/DL (ref 0–99)
POTASSIUM SERPL-SCNC: 4.5 MMOL/L (ref 3.5–5.2)
PROT SERPL-MCNC: 6.6 G/DL (ref 6–8.5)
SODIUM SERPL-SCNC: 142 MMOL/L (ref 134–144)
T4 FREE SERPL-MCNC: 1.17 NG/DL (ref 0.82–1.77)
TRIGL SERPL-MCNC: 50 MG/DL (ref 0–149)
TSH SERPL DL<=0.005 MIU/L-ACNC: 1.97 UIU/ML (ref 0.45–4.5)
VLDLC SERPL CALC-MCNC: 11 MG/DL (ref 5–40)

## 2021-03-19 NOTE — PROGRESS NOTES
Cholesterol excellent. Normal kidney, liver, thyroid labs.  Vit d low - start vit d3 2000IU daily - Inst[employer] message sent

## 2021-06-08 ENCOUNTER — VIRTUAL VISIT (OUTPATIENT)
Dept: INTERNAL MEDICINE CLINIC | Age: 46
End: 2021-06-08
Payer: COMMERCIAL

## 2021-06-08 DIAGNOSIS — J01.20 ACUTE NON-RECURRENT ETHMOIDAL SINUSITIS: Primary | ICD-10-CM

## 2021-06-08 PROCEDURE — 99213 OFFICE O/P EST LOW 20 MIN: CPT | Performed by: INTERNAL MEDICINE

## 2021-06-08 RX ORDER — AMOXICILLIN AND CLAVULANATE POTASSIUM 875; 125 MG/1; MG/1
1 TABLET, FILM COATED ORAL EVERY 12 HOURS
Qty: 14 TABLET | Refills: 0 | Status: SHIPPED | OUTPATIENT
Start: 2021-06-08 | End: 2021-06-15

## 2021-06-08 NOTE — PROGRESS NOTES
1. Have you been to the ER, urgent care clinic since your last visit? Hospitalized since your last visit? No    2. Have you seen or consulted any other health care providers outside of the [address] [place] [address] since your last visit? Include any pap smears or colon screening.  No  Chief Complaint   Patient presents with    Fever     last night 100.4    Cough    Cold Symptoms     Pain -0- sinus pressure    Taking Tylenol Sinus

## 2021-06-08 NOTE — PROGRESS NOTES
Ana Becerra is a 39 y.o. female who was seen by synchronous (real-time) audio-video technology on 6/8/2021 for Fever (last night 100.4), Cough, and Cold Symptoms        Assessment & Plan:   Diagnoses and all orders for this visit:    1. Acute non-recurrent ethmoidal sinusitis  Treat with Augmentin. Recommended Mucinex for congestion.  -     Start amoxicillin-clavulanate (AUGMENTIN) 875-125 mg per tablet; Take 1 Tablet by mouth every twelve (12) hours for 7 days. Follow-up and Dispositions    · Return if symptoms worsen or fail to improve, for Follow up with Dr. Raisa Cantrell (PCP) as scheduled. 712  Subjective:     Patient complains of sinus congestion that started 2 days ago. Sinus pain and congestion at upper bridge of nose bilaterally. Also having fevers (last night with T 100.4), ear congestion and popping, purulent nasal discharge, cough, and change in taste ( \"everything tastes funny\"). Has taken Tylenol Sinus without relief. Prior to Admission medications    Medication Sig Start Date End Date Taking?  Authorizing Provider   clobetasoL (TEMOVATE) 0.05 % ointment APPLY TO RASH ON LEGS TWICE DAILY X 2 WEEKS 2/8/21  Yes Provider, Historical     Patient Active Problem List   Diagnosis Code    Obesity E66.9    Enlarged lymph nodes R59.9    Carpal tunnel syndrome on both sides G56.03    History of laparoscopic adjustable gastric banding Z98.84    Severe obesity ([facility] [address] E66.01       Objective:     Patient-Reported Vitals 6/8/2021   Patient-Reported Temperature 98.5      General: alert, cooperative, no distress   Mental  status: normal mood, behavior, speech, dress, motor activity, and thought processes, able to follow commands   HENT: NCAT   Neck: no visualized mass   Resp: no respiratory distress   Neuro: no gross deficits   Skin: no discoloration or lesions of concern on visible areas   Psychiatric: normal affect, consistent with stated mood, no evidence of hallucinations     Additional exam findings: none      We discussed the expected course, resolution and complications of the diagnosis(es) in detail. Medication risks, benefits, costs, interactions, and alternatives were discussed as indicated. I advised her to contact the office if her condition worsens, changes or fails to improve as anticipated. She expressed understanding with the diagnosis(es) and plan. THIS VISIT WAS COMPLETED VIRTUALLY USING [employer]. Lori Monzon, was evaluated through a synchronous (real-time) audio-video encounter. The patient (or guardian if applicable) is aware that this is a billable service. Verbal consent to proceed has been obtained within the past 12 months. The visit was conducted pursuant to the emergency declaration under the [facility] [address] authority and the [employer] Act. Patient identification was verified, and a caregiver was present when appropriate. The patient was located in a state where the provider was credentialed to provide care.     Osbaldo Granado MD

## 2021-09-24 LAB — MAMMOGRAPHY, EXTERNAL: NORMAL

## 2022-03-19 PROBLEM — E66.01 SEVERE OBESITY (HCC): Status: ACTIVE | Noted: 2019-04-28

## 2022-05-18 ENCOUNTER — OFFICE VISIT (OUTPATIENT)
Dept: INTERNAL MEDICINE CLINIC | Age: 47
End: 2022-05-18
Payer: COMMERCIAL

## 2022-05-18 VITALS
SYSTOLIC BLOOD PRESSURE: 117 MMHG | TEMPERATURE: 98.1 F | DIASTOLIC BLOOD PRESSURE: 76 MMHG | OXYGEN SATURATION: 97 % | HEIGHT: 64 IN | BODY MASS INDEX: 40.69 KG/M2 | RESPIRATION RATE: 16 BRPM | HEART RATE: 68 BPM | WEIGHT: 238.36 LBS

## 2022-05-18 DIAGNOSIS — Z00.00 WELL ADULT EXAM: Primary | ICD-10-CM

## 2022-05-18 PROCEDURE — 99396 PREV VISIT EST AGE 40-64: CPT | Performed by: INTERNAL MEDICINE

## 2022-05-18 NOTE — PROGRESS NOTES
HPI  Ms. Grazyna Groves is a 42y.o. year old female, she is seen today for well exam.   Had lap band removed and gastric sleeve on February 9 also had hiatal hernia at same time. No n/v/abd pain. No chest pain or sob. Since starting pre op diet has lost #38, gradual weight loss. Exercise - walking and some jogging distance 5K per day on treadmill    Up to date on mammogram.         Chief Complaint   Patient presents with    Physical     Room 2C //         Prior to Admission medications    Medication Sig Start Date End Date Taking? Authorizing Provider   clobetasoL (TEMOVATE) 0.05 % ointment APPLY TO RASH ON LEGS TWICE DAILY X 2 WEEKS 2/8/21   Provider, Historical         Allergies   Allergen Reactions    Hydrocodone Nausea and Vomiting         REVIEW OF SYSTEMS:  Per HPI    PHYSICAL EXAM:  Visit Vitals  /76 (BP 1 Location: Left upper arm, BP Patient Position: Sitting, BP Cuff Size: Large adult)   Pulse 68   Temp 98.1 °F (36.7 °C) (Oral)   Resp 16   Ht 5' 4\" (1.626 m)   Wt 238 lb 5.8 oz (108.1 kg)   LMP 05/07/2022   SpO2 97%   BMI 40.91 kg/m²     Constitutional: Appears well-developed and well-nourished. No distress. HENT:   Head: Normocephalic and atraumatic. Eyes: No scleral icterus. Neck: no lad, no tm, supple   Cardiovascular: Normal S1/S2, regular rhythm. No murmurs, rubs, or gallops. Pulmonary/Chest: Effort normal and breath sounds normal. No respiratory distress. No wheezes, rhonchi, or rales. Abdomen: Soft, NT/ND, +BS, no rebound or guarding, no masses, no HSM appreciated. Well healed incision sites. Ext: No edema. Neurological: Alert. Psychiatric: Normal mood and affect.  Behavior is normal.     Lab Results   Component Value Date/Time    Sodium 142 03/18/2021 08:45 AM    Potassium 4.5 03/18/2021 08:45 AM    Chloride 106 03/18/2021 08:45 AM    CO2 23 03/18/2021 08:45 AM    Anion gap 12 10/26/2010 10:17 AM    Glucose 95 03/18/2021 08:45 AM    BUN 21 03/18/2021 08:45 AM Creatinine 0.94 03/18/2021 08:45 AM    BUN/Creatinine ratio 22 03/18/2021 08:45 AM    GFR est AA 86 03/18/2021 08:45 AM    GFR est non-AA 75 03/18/2021 08:45 AM    Calcium 9.1 03/18/2021 08:45 AM    Bilirubin, total 0.4 03/18/2021 08:45 AM    Alk. phosphatase 73 03/18/2021 08:45 AM    Protein, total 6.6 03/18/2021 08:45 AM    Albumin 4.3 03/18/2021 08:45 AM    Globulin 3.5 10/26/2010 10:17 AM    A-G Ratio 1.9 03/18/2021 08:45 AM    ALT (SGPT) 16 03/18/2021 08:45 AM     Lab Results   Component Value Date/Time    Hemoglobin A1c 5.2 04/04/2017 10:37 AM      Lab Results   Component Value Date/Time    Cholesterol, total 153 03/18/2021 08:45 AM    HDL Cholesterol 65 03/18/2021 08:45 AM    LDL, calculated 77 03/18/2021 08:45 AM    LDL, calculated 91 04/11/2019 08:50 AM    VLDL, calculated 11 03/18/2021 08:45 AM    VLDL, calculated 9 04/11/2019 08:50 AM    Triglyceride 50 03/18/2021 08:45 AM    CHOL/HDL Ratio 3.4 10/26/2010 10:17 AM          ASSESSMENT/PLAN  Diagnoses and all orders for this visit:    1. Well adult exam    encouraged continued exercise, get labs as above      Health Maintenance Due   Topic Date Due    Hepatitis C Screening  Never done    COVID-19 Vaccine (1) Never done    Breast Cancer Screen Mammogram  09/04/2021               Reviewed plan of care. Patient has provided input and agrees with goals. The nurse provided the patient and/or family with advanced directive information if needed and encouraged the patient to provide a copy to the office when available.

## 2022-05-18 NOTE — PATIENT INSTRUCTIONS
Well Visit, Ages 25 to 48: Care Instructions  Overview     Well visits can help you stay healthy. Your doctor has checked your overall health and may have suggested ways to take good care of yourself. Your doctor also may have recommended tests. At home, you can help prevent illness with healthy eating, regular exercise, and other steps. Follow-up care is a key part of your treatment and safety. Be sure to make and go to all appointments, and call your doctor if you are having problems. It's also a good idea to know your test results and keep a list of the medicines you take. How can you care for yourself at home? · Get screening tests that you and your doctor decide on. Screening helps find diseases before any symptoms appear. · Eat healthy foods. Choose fruits, vegetables, whole grains, protein, and low-fat dairy foods. Limit fat, especially saturated fat. Reduce salt in your diet. · Limit alcohol. If you are a man, have no more than 2 drinks a day or 14 drinks a week. If you are a woman, have no more than 1 drink a day or 7 drinks a week. · Get at least 30 minutes of physical activity on most days of the week. Walking is a good choice. You also may want to do other activities, such as running, swimming, cycling, or playing tennis or team sports. Discuss any changes in your exercise program with your doctor. · Reach and stay at a healthy weight. This will lower your risk for many problems, such as obesity, diabetes, heart disease, and high blood pressure. · Do not smoke or allow others to smoke around you. If you need help quitting, talk to your doctor about stop-smoking programs and medicines. These can increase your chances of quitting for good. · Care for your mental health. It is easy to get weighed down by worry and stress. Learn strategies to manage stress, like deep breathing and mindfulness, and stay connected with your family and community.  If you find you often feel sad or hopeless, talk with your doctor. Treatment can help. · Talk to your doctor about whether you have any risk factors for sexually transmitted infections (STIs). You can help prevent STIs if you wait to have sex with a new partner (or partners) until you've each been tested for STIs. It also helps if you use condoms (male or female condoms) and if you limit your sex partners to one person who only has sex with you. Vaccines are available for some STIs, such as HPV. · Use birth control if it's important to you to prevent pregnancy. Talk with your doctor about the choices available and what might be best for you. · If you think you may have a problem with alcohol or drug use, talk to your doctor. This includes prescription medicines (such as amphetamines and opioids) and illegal drugs (such as cocaine and methamphetamine). Your doctor can help you figure out what type of treatment is best for you. · Protect your skin from too much sun. When you're outdoors from 10 a.m. to 4 p.m., stay in the shade or cover up with clothing and a hat with a wide brim. Wear sunglasses that block UV rays. Even when it's cloudy, put broad-spectrum sunscreen (SPF 30 or higher) on any exposed skin. · See a dentist one or two times a year for checkups and to have your teeth cleaned. · Wear a seat belt in the car. When should you call for help? Watch closely for changes in your health, and be sure to contact your doctor if you have any problems or symptoms that concern you. Where can you learn more? Go to http://www.[employer].com/  Enter P072 in the search box to learn more about \"Well Visit, Ages 25 to 48: Care Instructions. \"  Current as of: October 6, 2021               Content Version: 13.2  © 2811-5467 [employer], [employer]. Care instructions adapted under license by [employer] (which disclaims liability or warranty for this information).  If you have questions about a medical condition or this instruction, always ask your healthcare professional. [place] [address] any warranty or liability for your use of this information.

## 2022-05-18 NOTE — PROGRESS NOTES
Danny Smith  Identified pt with two pt identifiers(name and ). Chief Complaint   Patient presents with    Physical     Room 2C //        Reviewed record In preparation for visit and have obtained necessary documentation. 1. Have you been to the ER, urgent care clinic or hospitalized since your last visit? Yes. [facility] ED    2. Have you seen or consulted any other health care providers outside of the [address] [place] [address] since your last visit? Include any pap smears or colon screening. No    Patient does not have an advance directive. Vitals reviewed with provider.     Health Maintenance reviewed:     Health Maintenance Due   Topic    Hepatitis C Screening     COVID-19 Vaccine (1)    Depression Screen         Wt Readings from Last 3 Encounters:   22 238 lb 5.8 oz (108.1 kg)   21 250 lb 12.8 oz (113.8 kg)   20 227 lb 3.2 oz (103.1 kg)      Temp Readings from Last 3 Encounters:   21 98 °F (36.7 °C) (Oral)   20 99.3 °F (37.4 °C) (Oral)   19 98.5 °F (36.9 °C) (Oral)      BP Readings from Last 3 Encounters:   21 109/74   20 117/87   19 109/68      Pulse Readings from Last 3 Encounters:   21 60   20 87   19 60      Vitals:    22 1502   Resp: 16   Weight: 238 lb 5.8 oz (108.1 kg)   Height: 5' 4\" (1.626 m)   PainSc:   0 - No pain   LMP: 2022          Learning Assessment:   :     Learning Assessment 2018   PRIMARY LEARNER Patient   HIGHEST LEVEL OF EDUCATION - PRIMARY LEARNER  4 YEARS OF COLLEGE   BARRIERS PRIMARY LEARNER NONE   PRIMARY LANGUAGE ENGLISH   LEARNER PREFERENCE PRIMARY VIDEOS   LEARNING SPECIAL TOPICS no   ANSWERED BY self   RELATIONSHIP SELF        Depression Screening:   :     3 most recent PHQ Screens 2021   Little interest or pleasure in doing things Not at all   Feeling down, depressed, irritable, or hopeless Several days   Total Score PHQ 2 1        Fall Risk Assessment:   : Fall Risk Assessment, last 12 mths 4/18/2019   Able to walk? Yes   Fall in past 12 months? No        Abuse Screening:   :     Abuse Screening Questionnaire 4/18/2019   Do you ever feel afraid of your partner? N   Are you in a relationship with someone who physically or mentally threatens you? N   Is it safe for you to go home?  Y        ADL Screening:   :     ADL Assessment 4/18/2019   Feeding yourself No Help Needed   Getting from bed to chair No Help Needed   Getting dressed No Help Needed   Bathing or showering No Help Needed   Walk across the room (includes cane/walker) No Help Needed   Using the telphone No Help Needed   Taking your medications No Help Needed   Preparing meals No Help Needed   Managing money (expenses/bills) No Help Needed   Moderately strenuous housework (laundry) No Help Needed   Shopping for personal items (toiletries/medicines) No Help Needed   Shopping for groceries No Help Needed   Driving No Help Needed   Climbing a flight of stairs No Help Needed   Getting to places beyond walking distances No Help Needed

## 2022-06-16 ENCOUNTER — APPOINTMENT (OUTPATIENT)
Dept: URBAN - METROPOLITAN AREA CLINIC 277 | Age: 47
Setting detail: DERMATOLOGY
End: 2022-06-17

## 2022-06-16 DIAGNOSIS — I83.9 ASYMPTOMATIC VARICOSE VEINS OF LOWER EXTREMITIES: ICD-10-CM

## 2022-06-16 DIAGNOSIS — D18.0 HEMANGIOMA: ICD-10-CM

## 2022-06-16 DIAGNOSIS — Z80.8 FAMILY HISTORY OF MALIGNANT NEOPLASM OF OTHER ORGANS OR SYSTEMS: ICD-10-CM

## 2022-06-16 DIAGNOSIS — L81.4 OTHER MELANIN HYPERPIGMENTATION: ICD-10-CM

## 2022-06-16 DIAGNOSIS — D22 MELANOCYTIC NEVI: ICD-10-CM

## 2022-06-16 DIAGNOSIS — Z71.89 OTHER SPECIFIED COUNSELING: ICD-10-CM

## 2022-06-16 PROBLEM — I83.91 ASYMPTOMATIC VARICOSE VEINS OF RIGHT LOWER EXTREMITY: Status: ACTIVE | Noted: 2022-06-16

## 2022-06-16 PROBLEM — D18.01 HEMANGIOMA OF SKIN AND SUBCUTANEOUS TISSUE: Status: ACTIVE | Noted: 2022-06-16

## 2022-06-16 PROBLEM — D22.5 MELANOCYTIC NEVI OF TRUNK: Status: ACTIVE | Noted: 2022-06-16

## 2022-06-16 PROBLEM — D22.4 MELANOCYTIC NEVI OF SCALP AND NECK: Status: ACTIVE | Noted: 2022-06-16

## 2022-06-16 PROCEDURE — 99213 OFFICE O/P EST LOW 20 MIN: CPT

## 2022-06-16 PROCEDURE — OTHER SUNSCREEN RECOMMENDATIONS: OTHER

## 2022-06-16 PROCEDURE — OTHER REASSURANCE: OTHER

## 2022-06-16 PROCEDURE — OTHER COUNSELING: OTHER

## 2022-06-16 PROCEDURE — OTHER MIPS QUALITY: OTHER

## 2022-06-16 ASSESSMENT — LOCATION DETAILED DESCRIPTION DERM
LOCATION DETAILED: RIGHT INFERIOR LATERAL LOWER BACK
LOCATION DETAILED: LEFT SUPERIOR UPPER BACK
LOCATION DETAILED: RIGHT RADIAL DORSAL HAND
LOCATION DETAILED: LEFT SUPERIOR ANTERIOR NECK
LOCATION DETAILED: LEFT RADIAL DORSAL HAND
LOCATION DETAILED: RIGHT MEDIAL KNEE

## 2022-06-16 ASSESSMENT — LOCATION SIMPLE DESCRIPTION DERM
LOCATION SIMPLE: LEFT UPPER BACK
LOCATION SIMPLE: RIGHT HAND
LOCATION SIMPLE: RIGHT KNEE
LOCATION SIMPLE: LEFT ANTERIOR NECK
LOCATION SIMPLE: LEFT HAND
LOCATION SIMPLE: RIGHT LOWER BACK

## 2022-06-16 ASSESSMENT — LOCATION ZONE DERM
LOCATION ZONE: TRUNK
LOCATION ZONE: LEG
LOCATION ZONE: HAND
LOCATION ZONE: NECK

## 2022-06-16 NOTE — PROCEDURE: REASSURANCE
Include Location In Plan?: Yes
Hide Additional Notes?: No
Detail Level: Zone
Detail Level: Simple
Detail Level: Generalized

## 2022-06-16 NOTE — PROCEDURE: MIPS QUALITY
Quality 110: Preventive Care And Screening: Influenza Immunization: Influenza Immunization Ordered or Recommended, but not Administered due to system reason
Quality 431: Preventive Care And Screening: Unhealthy Alcohol Use - Screening: Patient not identified as an unhealthy alcohol user when screened for unhealthy alcohol use using a systematic screening method
Detail Level: Detailed
Quality 111:Pneumonia Vaccination Status For Older Adults: Pneumococcal vaccine was not administered on or after patient’s 60th birthday and before the end of the measurement period, reason not otherwise specified
Quality 130: Documentation Of Current Medications In The Medical Record: Current Medications Documented
Quality 226: Preventive Care And Screening: Tobacco Use: Screening And Cessation Intervention: Patient screened for tobacco use and is an ex/non-smoker
Quality 400a: One-Time Screening For Hepatitis C Virus (Hcv) For All Patients: Patient received one-time screening for HCV infection

## 2022-11-25 ENCOUNTER — OFFICE VISIT (OUTPATIENT)
Dept: INTERNAL MEDICINE CLINIC | Age: 47
End: 2022-11-25
Payer: COMMERCIAL

## 2022-11-25 VITALS
BODY MASS INDEX: 40.99 KG/M2 | SYSTOLIC BLOOD PRESSURE: 110 MMHG | WEIGHT: 240.1 LBS | OXYGEN SATURATION: 99 % | HEART RATE: 58 BPM | RESPIRATION RATE: 12 BRPM | HEIGHT: 64 IN | DIASTOLIC BLOOD PRESSURE: 70 MMHG | TEMPERATURE: 98.3 F

## 2022-11-25 DIAGNOSIS — Z23 NEEDS FLU SHOT: Primary | ICD-10-CM

## 2022-11-25 DIAGNOSIS — S31.829A WOUND OF LEFT BUTTOCK, INITIAL ENCOUNTER: ICD-10-CM

## 2022-11-25 PROBLEM — N60.11 FIBROCYSTIC CHANGES OF RIGHT BREAST: Status: ACTIVE | Noted: 2022-03-24

## 2022-11-25 PROBLEM — R92.8 ABNORMAL FINDING ON BREAST IMAGING: Status: ACTIVE | Noted: 2021-10-08

## 2022-11-25 PROCEDURE — 99213 OFFICE O/P EST LOW 20 MIN: CPT | Performed by: PHYSICIAN ASSISTANT

## 2022-11-25 PROCEDURE — 90686 IIV4 VACC NO PRSV 0.5 ML IM: CPT | Performed by: PHYSICIAN ASSISTANT

## 2022-11-25 PROCEDURE — 90471 IMMUNIZATION ADMIN: CPT | Performed by: PHYSICIAN ASSISTANT

## 2022-11-25 RX ORDER — ENOXAPARIN SODIUM 100 MG/ML
INJECTION SUBCUTANEOUS
COMMUNITY
End: 2022-11-25 | Stop reason: ALTCHOICE

## 2022-11-25 RX ORDER — DOXYCYCLINE HYCLATE 100 MG
TABLET ORAL
COMMUNITY
End: 2022-11-25 | Stop reason: ALTCHOICE

## 2022-11-25 RX ORDER — HYOSCYAMINE SULFATE 0.12 MG/1
TABLET SUBLINGUAL
COMMUNITY
End: 2022-11-25 | Stop reason: ALTCHOICE

## 2022-11-25 RX ORDER — BROMPHENIRAMINE MALEATE, PSEUDOEPHEDRINE HYDROCHLORIDE, AND DEXTROMETHORPHAN HYDROBROMIDE 2; 30; 10 MG/5ML; MG/5ML; MG/5ML
SYRUP ORAL
COMMUNITY
End: 2022-11-25

## 2022-11-25 RX ORDER — CEPHALEXIN 500 MG/1
500 CAPSULE ORAL 3 TIMES DAILY
Qty: 21 CAPSULE | Refills: 0 | Status: SHIPPED | OUTPATIENT
Start: 2022-11-25

## 2022-11-25 RX ORDER — ALBUTEROL SULFATE 90 UG/1
AEROSOL, METERED RESPIRATORY (INHALATION)
COMMUNITY
End: 2022-11-25

## 2022-11-25 RX ORDER — PREDNISONE 20 MG/1
TABLET ORAL
COMMUNITY
End: 2022-11-25 | Stop reason: ALTCHOICE

## 2022-11-25 NOTE — PROGRESS NOTES
Gayathri Regalado is a 41y.o. year old female seen in clinic today for   Chief Complaint   Patient presents with    Wound Check       she presents with wound x 2 weeks. Had no trauma to area but had blister that started on L buttocks and then popped. Has been open but then started to cover with bandage and treat with Neosporin, cleaning with aquaphor and hydrogen peroxide. No other issues, no fevers or chills. Current Outpatient Medications on File Prior to Visit   Medication Sig Dispense Refill    clobetasoL (TEMOVATE) 0.05 % ointment APPLY TO RASH ON LEGS TWICE DAILY X 2 WEEKS      [DISCONTINUED] albuterol (PROVENTIL HFA, VENTOLIN HFA, PROAIR HFA) 90 mcg/actuation inhaler albuterol sulfate HFA 90 mcg/actuation aerosol inhaler (Patient not taking: Reported on 11/25/2022)      [DISCONTINUED] brompheniramine-pseudoeph-DM (DIMETAPP) 2-30-10 mg/5 mL syrup brompheniramine-pseudoephedrine-DM 2 mg-30 mg-10 mg/5 mL oral syrup   TAKE 10 ML EVERY 4 HOURS BY ORAL ROUTE AS NEEDED FOR 5 DAYS. (Patient not taking: Reported on 11/25/2022)      [DISCONTINUED] doxycycline (VIBRA-TABS) 100 mg tablet doxycycline hyclate 100 mg tablet   TAKE 1 TABLET BY MOUTH TWICE A DAY WITH MEALS FOR 10 DAYS (Patient not taking: Reported on 11/25/2022)      [DISCONTINUED] enoxaparin (LOVENOX) 40 mg/0.4 mL enoxaparin 40 mg/0.4 mL subcutaneous syringe (Patient not taking: Reported on 11/25/2022)      [DISCONTINUED] hyoscyamine SL (LEVSIN/SL) 0.125 mg SL tablet hyoscyamine 0.125 mg sublingual tablet (Patient not taking: Reported on 11/25/2022)      [DISCONTINUED] predniSONE (DELTASONE) 20 mg tablet prednisone 20 mg tablet   Take 2 tablets every day by oral route for 5 days. (Patient not taking: Reported on 11/25/2022)       No current facility-administered medications on file prior to visit.          Allergies   Allergen Reactions    Hydrocodone Nausea and Vomiting    Nsaids (Non-Steroidal Anti-Inflammatory Drug) Unknown (comments)     Past Medical History:   Diagnosis Date    History of laparoscopic adjustable gastric banding 2010      Past Surgical History:   Procedure Laterality Date    HC LAP BAND ADJUST PROCEDURE  2010    HX BARIATRIC SURGERY  2022    Sleeve & lapband removal    HX  SECTION  10/8/13    HX COLONOSCOPY      HX GI      lap band 2010    HX OTHER SURGICAL  2010    Lap gastric banding.     CT BREAST SURGERY PROCEDURE UNLISTED  Oct 2021    Benign        Family History   Problem Relation Age of Onset    Diabetes Father     Stroke Father 72        TIA    Prostate Cancer Father     Cancer Father         prostate    Cancer Paternal Grandmother         colon    Heart Disease Maternal Grandfather         Social History     Socioeconomic History    Marital status:      Spouse name: Not on file    Number of children: Not on file    Years of education: Not on file    Highest education level: Not on file   Occupational History    Not on file   Tobacco Use    Smoking status: Former     Packs/day: 0.25     Types: Cigarettes     Quit date: 2008     Years since quittin.8    Smokeless tobacco: Never   Vaping Use    Vaping Use: Never used   Substance and Sexual Activity    Alcohol use: Not Currently     Alcohol/week: 2.0 standard drinks     Types: 2 Cans of beer per week     Comment: socially    Drug use: No    Sexual activity: Yes     Partners: Male     Birth control/protection: None   Other Topics Concern    Not on file   Social History Narrative    ** Merged History Encounter **          Social Determinants of Health     Financial Resource Strain: Not on file   Food Insecurity: Not on file   Transportation Needs: Not on file   Physical Activity: Not on file   Stress: Not on file   Social Connections: Not on file   Intimate Partner Violence: Not on file   Housing Stability: Not on file         Visit Vitals  /70 (BP 1 Location: Left upper arm, BP Patient Position: Sitting)   Pulse (!) 58   Temp 98.3 °F (36.8 °C) (Oral)   Resp 12   Ht 5' 4\" (1.626 m)   Wt 240 lb 1.6 oz (108.9 kg)   LMP 11/02/2022   SpO2 99%   BMI 41.21 kg/m²       Review of Systems   Constitutional:  Negative for chills, fever, malaise/fatigue and weight loss. HENT: Negative. Negative for sore throat. Respiratory:  Negative for shortness of breath. Cardiovascular:  Negative for chest pain. Gastrointestinal:  Negative for abdominal pain, diarrhea, nausea and vomiting. Genitourinary: Negative. Skin: Negative. Neurological:  Negative for weakness and headaches. Psychiatric/Behavioral:  Negative for depression. Physical Exam  Constitutional:       Appearance: Normal appearance. She is obese. HENT:      Head: Normocephalic and atraumatic. Right Ear: External ear normal.      Left Ear: External ear normal.      Nose: Nose normal.      Mouth/Throat:      Mouth: Mucous membranes are moist.   Eyes:      Conjunctiva/sclera: Conjunctivae normal.   Cardiovascular:      Rate and Rhythm: Normal rate and regular rhythm. Pulses: Normal pulses. Heart sounds: Normal heart sounds. Pulmonary:      Effort: Pulmonary effort is normal.      Breath sounds: Normal breath sounds. No stridor. No wheezing, rhonchi or rales. Musculoskeletal:         General: No deformity. Cervical back: Normal range of motion and neck supple. Right lower leg: No edema. Left lower leg: No edema. Skin:     Comments: L lateral buttocks with 0.75 x 1 cm wound with central scab and mild erythamtous border    Neurological:      General: No focal deficit present. Mental Status: She is alert. Psychiatric:         Mood and Affect: Mood normal.         Behavior: Behavior normal.         ASSESSMENT AND PLAN:  Diagnoses and all orders for this visit:    1. Needs flu shot  -     INFLUENZA, FLUARIX, FLULAVAL, FLUZONE (AGE 6 MO+), AFLURIA(AGE 3Y+) IM, PF, 0.5 ML    2.  Wound of left buttock, initial encounter  -     cephALEXin (KEFLEX) 500 mg capsule; Take 1 Capsule by mouth three (3) times daily. Wound healing well with current routine but given location and timing will cover with antibiotic to cover irregular skin bacterial types. Return if not improving, recommend continued wound care as before       I have discussed the diagnosis with the patient and the intended plan as seen in the above orders. Patient is in agreement. The patient has received an after-visit summary and questions were answered concerning future plans. I have discussed medication side effects and warnings with the patient as well.     Randal Garnica PA-C

## 2022-11-25 NOTE — PROGRESS NOTES
Iman Lutz  Identified pt with two pt identifiers(name and ). Chief Complaint   Patient presents with    Wound Check       Reviewed record In preparation for visit and have obtained necessary documentation. 1. Have you been to the ER, urgent care clinic or hospitalized since your last visit? No     2. Have you seen or consulted any other health care providers outside of the [address] [place] [address] since your last visit? Include any pap smears or colon screening. No    Vitals reviewed with provider. Health Maintenance reviewed: After verbal order read back of [facility], patient received Flu Shot in Left deltoid. [facility]: [id number] Lot: [id number]. Patient tolerated procedure without complaints and received VIS.       Health Maintenance Due   Topic    Hepatitis C Screening     COVID-19 Vaccine (1)    Flu Vaccine (1)          Wt Readings from Last 3 Encounters:   22 240 lb 1.6 oz (108.9 kg)   22 238 lb 5.8 oz (108.1 kg)   21 250 lb 12.8 oz (113.8 kg)        Temp Readings from Last 3 Encounters:   22 98.1 °F (36.7 °C) (Oral)   21 98 °F (36.7 °C) (Oral)   20 99.3 °F (37.4 °C) (Oral)        BP Readings from Last 3 Encounters:   22 110/70   22 117/76   21 109/74        Pulse Readings from Last 3 Encounters:   22 (!) 58   22 68   21 60        Vitals:    22 1142   BP: 110/70   Pulse: (!) 58   Resp: 12   SpO2: 99%   Weight: 240 lb 1.6 oz (108.9 kg)   Height: 5' 4\" (1.626 m)   PainSc:   0 - No pain   LMP: 2022          Learning Assessment:   :       Learning Assessment 2018   PRIMARY LEARNER Patient   HIGHEST LEVEL OF EDUCATION - PRIMARY LEARNER  4 YEARS OF COLLEGE   BARRIERS PRIMARY LEARNER NONE   PRIMARY LANGUAGE ENGLISH   LEARNER PREFERENCE PRIMARY VIDEOS   LEARNING SPECIAL TOPICS no   ANSWERED BY self   RELATIONSHIP SELF        Depression Screening:   :       3 most recent PHQ Screens 2022   Little interest or pleasure in doing things Not at all   Feeling down, depressed, irritable, or hopeless Not at all   Total Score PHQ 2 0        Fall Risk Assessment:   :       Fall Risk Assessment, last 12 mths 4/18/2019   Able to walk? Yes   Fall in past 12 months? No        Abuse Screening:   :       Abuse Screening Questionnaire 4/18/2019   Do you ever feel afraid of your partner? N   Are you in a relationship with someone who physically or mentally threatens you? N   Is it safe for you to go home?  Y        ADL Screening:   :       ADL Assessment 4/18/2019   Feeding yourself No Help Needed   Getting from bed to chair No Help Needed   Getting dressed No Help Needed   Bathing or showering No Help Needed   Walk across the room (includes cane/walker) No Help Needed   Using the telphone No Help Needed   Taking your medications No Help Needed   Preparing meals No Help Needed   Managing money (expenses/bills) No Help Needed   Moderately strenuous housework (laundry) No Help Needed   Shopping for personal items (toiletries/medicines) No Help Needed   Shopping for groceries No Help Needed   Driving No Help Needed   Climbing a flight of stairs No Help Needed   Getting to places beyond walking distances No Help Needed

## 2022-11-25 NOTE — PATIENT INSTRUCTIONS
Vaccine Information Statement    Influenza (Flu) Vaccine (Inactivated or Recombinant): What You Need to Know    Many vaccine information statements are available in Greek and other languages. See www.immunize.org/vis. Hojas de información sobre vacunas están disponibles en español y en muchos otros idiomas. Visite www.immunize.org/vis. 1. Why get vaccinated? Influenza vaccine can prevent influenza (flu). Flu is a contagious disease that spreads around the [facility] every year, usually between October and May. Anyone can get the flu, but it is more dangerous for some people. Infants and young children, people 72 years and older, pregnant people, and people with certain health conditions or a weakened immune system are at greatest risk of flu complications. Pneumonia, bronchitis, sinus infections, and ear infections are examples of flu-related complications. If you have a medical condition, such as heart disease, cancer, or diabetes, flu can make it worse. Flu can cause fever and chills, sore throat, muscle aches, fatigue, cough, headache, and runny or stuffy nose. Some people may have vomiting and diarrhea, though this is more common in children than adults. In an average year, thousands of people in the [facility] die from flu, and many more are hospitalized. Flu vaccine prevents millions of illnesses and flu-related visits to the doctor each year. 2. Influenza vaccines     CDC recommends everyone 6 months and older get vaccinated every flu season. Children 6 months through 6years of age may need 2 doses during a single flu season. Everyone else needs only 1 dose each flu season. It takes about 2 weeks for protection to develop after vaccination. There are many flu viruses, and they are always changing. Each year a new flu vaccine is made to protect against the influenza viruses believed to be likely to cause disease in the upcoming flu season.  Even when the vaccine doesnt exactly match these viruses, it may still provide some protection. Influenza vaccine does not cause flu. Influenza vaccine may be given at the same time as other vaccines. 3. Talk with your health care provider    Tell your vaccination provider if the person getting the vaccine:  Has had an allergic reaction after a previous dose of influenza vaccine, or has any severe, life-threatening allergies   Has ever had Guillain-Barré Syndrome (also called GBS)    In some cases, your health care provider may decide to postpone influenza vaccination until a future visit. Influenza vaccine can be administered at any time during pregnancy. People who are or will be pregnant during influenza season should receive inactivated influenza vaccine. People with minor illnesses, such as a cold, may be vaccinated. People who are moderately or severely ill should usually wait until they recover before getting influenza vaccine. Your health care provider can give you more information. 4. Risks of a vaccine reaction    Soreness, redness, and swelling where the shot is given, fever, muscle aches, and headache can happen after influenza vaccination. There may be a very small increased risk of Guillain-Barré Syndrome (GBS) after inactivated influenza vaccine (the flu shot). [facility] children who get the flu shot along with pneumococcal vaccine (PCV13) and/or DTaP vaccine at the same time might be slightly more likely to have a seizure caused by fever. Tell your health care provider if a child who is getting flu vaccine has ever had a seizure. People sometimes faint after medical procedures, including vaccination. Tell your provider if you feel dizzy or have vision changes or ringing in the ears. As with any medicine, there is a very remote chance of a vaccine causing a severe allergic reaction, other serious injury, or death. 5. What if there is a serious problem?     An allergic reaction could occur after the vaccinated person leaves the clinic. If you see signs of a severe allergic reaction (hives, swelling of the face and throat, difficulty breathing, a fast heartbeat, dizziness, or weakness), call 9-1-1 and get the person to the nearest hospital.    For other signs that concern you, call your health care provider. Adverse reactions should be reported to the Vaccine Adverse Event Reporting System (VAERS). Your health care provider will usually file this report, or you can do it yourself. Visit the VAERS website at www.vaers. [facility].gov or call 6-407.729.2152. VAERS is only for reporting reactions, and VAERS staff members do not give medical advice. 6. The National Vaccine Injury Compensation Program    The [facility] Vaccine Injury Compensation Program (VICP) is a federal program that was created to compensate people who may have been injured by certain vaccines. Claims regarding alleged injury or death due to vaccination have a time limit for filing, which may be as short as two years. Visit the VICP website at www.[facility].gov/vaccinecompensation or call 8-924.868.1196 to learn about the program and about filing a claim. 7. How can I learn more? Ask your health care provider. Call your local or state health department. Visit the website of the Food and Drug Administration (FDA) for vaccine package inserts and additional information at www.fda.gov/vaccines-blood-biologics/vaccines. Contact the Centers for Disease Control and Prevention (CDC): Call 6-276.820.9856 (7-384-QSR-INFO) or  Visit CDCs influenza website at www.cdc.gov/flu. Vaccine Information Statement   Inactivated Influenza Vaccine   8/6/2021  42 ELO Escobedo 829JY-22   Department of Health and Human Services  Centers for Disease Control and Prevention    Office Use Only

## 2023-05-04 ENCOUNTER — TELEPHONE (OUTPATIENT)
Dept: INTERNAL MEDICINE CLINIC | Age: 48
End: 2023-05-04

## 2023-05-04 DIAGNOSIS — E66.9 OBESITY, UNSPECIFIED CLASSIFICATION, UNSPECIFIED OBESITY TYPE, UNSPECIFIED WHETHER SERIOUS COMORBIDITY PRESENT: Primary | Chronic | ICD-10-CM

## 2023-07-17 ENCOUNTER — OFFICE VISIT (OUTPATIENT)
Facility: CLINIC | Age: 48
End: 2023-07-17
Payer: COMMERCIAL

## 2023-07-17 VITALS
TEMPERATURE: 98.5 F | WEIGHT: 243.5 LBS | DIASTOLIC BLOOD PRESSURE: 69 MMHG | RESPIRATION RATE: 12 BRPM | SYSTOLIC BLOOD PRESSURE: 114 MMHG | HEIGHT: 64 IN | BODY MASS INDEX: 41.57 KG/M2 | HEART RATE: 58 BPM | OXYGEN SATURATION: 99 %

## 2023-07-17 DIAGNOSIS — Z00.00 WELL ADULT EXAM: Primary | ICD-10-CM

## 2023-07-17 DIAGNOSIS — E66.01 OBESITY, CLASS III, BMI 40-49.9 (MORBID OBESITY) (HCC): ICD-10-CM

## 2023-07-17 DIAGNOSIS — L20.84 INTRINSIC ECZEMA: ICD-10-CM

## 2023-07-17 PROCEDURE — 99396 PREV VISIT EST AGE 40-64: CPT | Performed by: INTERNAL MEDICINE

## 2023-07-17 RX ORDER — CLOBETASOL PROPIONATE 0.5 MG/G
OINTMENT TOPICAL
Qty: 45 G | Refills: 2 | Status: SHIPPED | OUTPATIENT
Start: 2023-07-17

## 2023-07-17 RX ORDER — SEMAGLUTIDE 0.25 MG/.5ML
INJECTION, SOLUTION SUBCUTANEOUS
COMMUNITY
Start: 2023-05-18

## 2023-07-17 SDOH — ECONOMIC STABILITY: HOUSING INSECURITY
IN THE LAST 12 MONTHS, WAS THERE A TIME WHEN YOU DID NOT HAVE A STEADY PLACE TO SLEEP OR SLEPT IN A SHELTER (INCLUDING NOW)?: NO

## 2023-07-17 SDOH — ECONOMIC STABILITY: FOOD INSECURITY: WITHIN THE PAST 12 MONTHS, YOU WORRIED THAT YOUR FOOD WOULD RUN OUT BEFORE YOU GOT MONEY TO BUY MORE.: NEVER TRUE

## 2023-07-17 SDOH — ECONOMIC STABILITY: FOOD INSECURITY: WITHIN THE PAST 12 MONTHS, THE FOOD YOU BOUGHT JUST DIDN'T LAST AND YOU DIDN'T HAVE MONEY TO GET MORE.: NEVER TRUE

## 2023-07-17 SDOH — ECONOMIC STABILITY: INCOME INSECURITY: HOW HARD IS IT FOR YOU TO PAY FOR THE VERY BASICS LIKE FOOD, HOUSING, MEDICAL CARE, AND HEATING?: NOT HARD AT ALL

## 2023-07-17 ASSESSMENT — PATIENT HEALTH QUESTIONNAIRE - PHQ9
SUM OF ALL RESPONSES TO PHQ QUESTIONS 1-9: 0
SUM OF ALL RESPONSES TO PHQ9 QUESTIONS 1 & 2: 0
SUM OF ALL RESPONSES TO PHQ QUESTIONS 1-9: 0
2. FEELING DOWN, DEPRESSED OR HOPELESS: 0
1. LITTLE INTEREST OR PLEASURE IN DOING THINGS: 0

## 2023-07-17 NOTE — PROGRESS NOTES
[place]  Identified pt with two pt identifiers(name and ). Chief Complaint   Patient presents with    Annual Exam       Reviewed record In preparation for visit and have obtained necessary documentation. 1. Have you been to the ER, urgent care clinic or hospitalized since your last visit? No     2. Have you seen or consulted any other health care providers outside of the [address] [place] [address] since your last visit? Include any pap smears or colon screening. No    Vitals reviewed with provider. Health Maintenance reviewed:     Health Maintenance Due   Topic    HIV screen     Hepatitis C screen     COVID-19 Vaccine (4 - Booster for Moderna series)    Cervical cancer screen           Wt Readings from Last 3 Encounters:   22 240 lb 1.6 oz (108.9 kg)   22 238 lb 5.8 oz (108.1 kg)        Temp Readings from Last 3 Encounters:   No data found for Temp        BP Readings from Last 3 Encounters:   22 110/70   22 117/76        Pulse Readings from Last 3 Encounters:   22 58   22 68      [unfilled]       Learning Assessment:   :     No flowsheet data found. Fall Risk Assessment:   :     No flowsheet data found. Abuse Screening:   :   No flowsheet data found. ADL Screening:   :     No flowsheet data found.

## 2023-08-07 ENCOUNTER — TELEPHONE (OUTPATIENT)
Facility: CLINIC | Age: 48
End: 2023-08-07

## 2023-08-07 DIAGNOSIS — M25.569 KNEE PAIN, UNSPECIFIED CHRONICITY, UNSPECIFIED LATERALITY: Primary | ICD-10-CM

## 2023-08-07 NOTE — TELEPHONE ENCOUNTER
Pt called and stated that she is having some knee pain from an old injury and would like a referral for who she should see.

## 2023-09-15 ENCOUNTER — TELEPHONE (OUTPATIENT)
Facility: CLINIC | Age: 48
End: 2023-09-15

## 2023-09-15 NOTE — TELEPHONE ENCOUNTER
Voice mail left with   Jordy Crain MD, Orthopedic Surgery   [facility] [address] [place] [address] ,[facility] [phone]   [address] [facility]   [phone].

## 2024-05-29 ENCOUNTER — OFFICE VISIT (OUTPATIENT)
Age: 49
End: 2024-05-29
Payer: COMMERCIAL

## 2024-05-29 VITALS
HEIGHT: 64 IN | DIASTOLIC BLOOD PRESSURE: 77 MMHG | HEART RATE: 83 BPM | WEIGHT: 243 LBS | BODY MASS INDEX: 41.48 KG/M2 | RESPIRATION RATE: 19 BRPM | SYSTOLIC BLOOD PRESSURE: 109 MMHG | OXYGEN SATURATION: 98 % | TEMPERATURE: 97.8 F

## 2024-05-29 DIAGNOSIS — G56.03 CARPAL TUNNEL SYNDROME, BILATERAL: Primary | ICD-10-CM

## 2024-05-29 PROCEDURE — 99203 OFFICE O/P NEW LOW 30 MIN: CPT | Performed by: ORTHOPAEDIC SURGERY

## 2024-05-29 PROCEDURE — 20526 THER INJECTION CARP TUNNEL: CPT | Performed by: ORTHOPAEDIC SURGERY

## 2024-05-29 RX ORDER — BETAMETHASONE SODIUM PHOSPHATE AND BETAMETHASONE ACETATE 3; 3 MG/ML; MG/ML
6 INJECTION, SUSPENSION INTRA-ARTICULAR; INTRALESIONAL; INTRAMUSCULAR; SOFT TISSUE ONCE
Status: COMPLETED | OUTPATIENT
Start: 2024-05-29 | End: 2024-05-29

## 2024-05-29 RX ADMIN — BETAMETHASONE SODIUM PHOSPHATE AND BETAMETHASONE ACETATE 6 MG: 3; 3 INJECTION, SUSPENSION INTRA-ARTICULAR; INTRALESIONAL; INTRAMUSCULAR; SOFT TISSUE at 10:24

## 2024-05-29 SDOH — HEALTH STABILITY: PHYSICAL HEALTH: ON AVERAGE, HOW MANY MINUTES DO YOU ENGAGE IN EXERCISE AT THIS LEVEL?: 60 MIN

## 2024-05-29 SDOH — HEALTH STABILITY: PHYSICAL HEALTH: ON AVERAGE, HOW MANY DAYS PER WEEK DO YOU ENGAGE IN MODERATE TO STRENUOUS EXERCISE (LIKE A BRISK WALK)?: 2 DAYS

## 2024-05-29 ASSESSMENT — PATIENT HEALTH QUESTIONNAIRE - PHQ9
SUM OF ALL RESPONSES TO PHQ QUESTIONS 1-9: 0
2. FEELING DOWN, DEPRESSED OR HOPELESS: NOT AT ALL
SUM OF ALL RESPONSES TO PHQ9 QUESTIONS 1 & 2: 0
1. LITTLE INTEREST OR PLEASURE IN DOING THINGS: NOT AT ALL
SUM OF ALL RESPONSES TO PHQ QUESTIONS 1-9: 0

## 2024-05-29 NOTE — PROGRESS NOTES
2024      CC: Bilateral hand pain    HPI:      This is a 48 y.o. year old female who complains of pain and numbness in the bilateral hand.  This pain is associated with nighttime.  Provocative activities include: sleeping, working, gripping.   The patient has attempted bracing as treatment for this issue.       PMH:  Past Medical History:   Diagnosis Date    History of laparoscopic adjustable gastric banding 2010       PSxHx:  Past Surgical History:   Procedure Laterality Date    BARIATRIC SURGERY  2022    Sleeve & lapband removal    BREAST SURGERY  Oct 2021    Benign     SECTION  10/8/13    COLONOSCOPY      GI      lap band 2010    LAP BAND ADJUST PROCEDURE  2010    OTHER SURGICAL HISTORY  2010    Lap gastric banding.       Meds:    Current Outpatient Medications:     WEGOVY 0.25 MG/0.5ML SOAJ SC injection, , Disp: , Rfl:     clobetasol (TEMOVATE) 0.05 % ointment, APPLY TO RASH ON LEGS TWICE DAILY X 2 WEEKS, Disp: 45 g, Rfl: 2    All:  Allergies   Allergen Reactions    Nsaids      Other reaction(s): Unknown (comments)    Codeine Nausea And Vomiting    Hydrocodone Nausea And Vomiting       Social Hx:  Social History     Socioeconomic History    Marital status:      Spouse name: None    Number of children: None    Years of education: None    Highest education level: None   Tobacco Use    Smoking status: Former     Current packs/day: 0.00     Types: Cigarettes     Quit date: 2008     Years since quittin.3    Smokeless tobacco: Never   Vaping Use    Vaping Use: Never used   Substance and Sexual Activity    Alcohol use: Not Currently     Alcohol/week: 2.0 standard drinks of alcohol    Drug use: No   Social History Narrative    ** Merged History Encounter **          Social Determinants of Health     Financial Resource Strain: Low Risk  (2023)    Overall Financial Resource Strain (CARDIA)     Difficulty of Paying Living Expenses: Not hard at all   Transportation

## 2024-05-29 NOTE — PROGRESS NOTES
Identified pt with two pt identifiers (name and ). Reviewed chart in preparation for visit and have obtained necessary documentation.    Holly Khanna is a 48 y.o. female Pain (Bi lateral wrist)  .    Vitals:    24 0946   BP: 109/77   Site: Left Upper Arm   Position: Sitting   Cuff Size: Large Adult   Pulse: 83   Resp: 19   Temp: 97.8 °F (36.6 °C)   TempSrc: Oral   SpO2: 98%   Weight: 110.2 kg (243 lb)   Height: 1.626 m (5' 4.02\")          1. Have you been to the ER, urgent care clinic since your last visit?  Hospitalized since your last visit?  no     2. Have you seen or consulted any other health care providers outside of the [facility] since your last visit?  Include any pap smears or colon screening.  yes - Dr Alistair Friend, with [facility].

## 2024-06-07 ENCOUNTER — SCHEDULED TELEPHONE ENCOUNTER (OUTPATIENT)
Age: 49
End: 2024-06-07

## 2024-06-07 DIAGNOSIS — G56.03 CARPAL TUNNEL SYNDROME, BILATERAL: Primary | ICD-10-CM

## 2024-06-07 NOTE — PROGRESS NOTES
Documentation:  I communicated with the patient and/or health care decision maker about her  Bilateral carpal tunnel injection.   Details of this discussion including any medical advice provided: This patient states that she has had very good relief of the chief complaint.    We discussed at length the treatment options going forward after injection.  We also discussed that injections can only be done every 3 months at most, and that the further length of time between injections is better from a medical standpoint.      Treatment plan will be: observation    Follow up: prn  Total Time: minutes: <5 minutes (not billable)    Holly Khanna was evaluated through a synchronous (real-time) audio encounter. Patient identification was verified at the start of the visit. She (or guardian if applicable) is aware that this is a billable service, which includes applicable co-pays. This visit was conducted with the patient's (and/or legal guardian's) verbal consent. She has not had a related appointment within my department in the past 7 days or scheduled within the next 24 hours.   The patient was located at Home: [address] [place] [address] [place] [id number].  The provider was located at Facility (Appt Dept): [address] [facility], [address] [place] [id number].    Note: not billable if this call serves to triage the patient into an appointment for the relevant concern  Yes, I confirm.   Holly Khanna is a 48 y.o. female evaluated via telephone on 6/7/2024 for Follow-up (F/u b/l wrist injections )  .        Lopez Villaseñor DO

## 2024-08-05 ENCOUNTER — TELEMEDICINE (OUTPATIENT)
Facility: CLINIC | Age: 49
End: 2024-08-05
Payer: COMMERCIAL

## 2024-08-05 ENCOUNTER — PATIENT MESSAGE (OUTPATIENT)
Facility: CLINIC | Age: 49
End: 2024-08-05

## 2024-08-05 DIAGNOSIS — Z12.11 COLON CANCER SCREENING: ICD-10-CM

## 2024-08-05 DIAGNOSIS — J06.9 VIRAL UPPER RESPIRATORY TRACT INFECTION: Primary | ICD-10-CM

## 2024-08-05 PROCEDURE — 99213 OFFICE O/P EST LOW 20 MIN: CPT | Performed by: INTERNAL MEDICINE

## 2024-08-05 RX ORDER — SEMAGLUTIDE 1 MG/.5ML
INJECTION, SOLUTION SUBCUTANEOUS
COMMUNITY
Start: 2024-07-22

## 2024-08-05 SDOH — ECONOMIC STABILITY: FOOD INSECURITY: WITHIN THE PAST 12 MONTHS, THE FOOD YOU BOUGHT JUST DIDN'T LAST AND YOU DIDN'T HAVE MONEY TO GET MORE.: NEVER TRUE

## 2024-08-05 SDOH — ECONOMIC STABILITY: FOOD INSECURITY: WITHIN THE PAST 12 MONTHS, YOU WORRIED THAT YOUR FOOD WOULD RUN OUT BEFORE YOU GOT MONEY TO BUY MORE.: NEVER TRUE

## 2024-08-05 SDOH — ECONOMIC STABILITY: INCOME INSECURITY: HOW HARD IS IT FOR YOU TO PAY FOR THE VERY BASICS LIKE FOOD, HOUSING, MEDICAL CARE, AND HEATING?: NOT HARD AT ALL

## 2024-08-05 NOTE — PROGRESS NOTES
Holly Khanna  Identified pt with two pt identifiers(name and ).     Chief Complaint   Patient presents with    Cough     Since Saturday.    Pharyngitis       Reviewed record In preparation for visit and have obtained necessary documentation.     1. Have you been to the ER, urgent care clinic or hospitalized since your last visit? No     2. Have you seen or consulted any other health care providers outside of the [facility] since your last visit? Include any pap smears or colon screening. No    Vitals reviewed with provider.    Health Maintenance reviewed:     Health Maintenance Due   Topic    Hepatitis B vaccine (1 of 3 - 3-dose series)    Hepatitis C screen     Diabetes screen     COVID-19 Vaccine ( season)    DTaP/Tdap/Td vaccine (2 - Td or Tdap)    Colorectal Cancer Screen     Flu vaccine (1)          Wt Readings from Last 3 Encounters:   24 110.2 kg (243 lb)   23 110.5 kg (243 lb 8 oz)   22 108.9 kg (240 lb 1.6 oz)        Temp Readings from Last 3 Encounters:   24 97.8 °F (36.6 °C) (Oral)   23 98.5 °F (36.9 °C) (Oral)        BP Readings from Last 3 Encounters:   24 109/77   23 114/69   22 110/70        Pulse Readings from Last 3 Encounters:   24 83   23 58   22 58      [unfilled]       Learning Assessment:   :         2023     9:00 AM   [facility] AMB LEARNING ASSESSMENT   Primary Learner Patient   level of education 4 YEARS OF COLLEGE   Barriers Factors NONE   co-learner caregiver No   Primary Language ENGLISH   Learning Preference DEMONSTRATION   Answered By patient   Relationship to Learner SELF         Fall Risk Assessment:   :         2024     9:50 AM   Amb Fall Risk Assessment and TUG Test   Do you feel unsteady or are you worried about falling?  no   2 or more falls in past year? no   Fall with injury in past year? no          Abuse Screening:   :          No data to display                   ADL Screening:

## 2024-08-05 NOTE — PROGRESS NOTES
2024    TELEHEALTH EVALUATION -- Audio/Visual    HPI:    Holly Khanna (:  1975) has requested an audio/video evaluation for the following concern(s):    Symptoms started Saturday - 3 days ago - felt tired, has sore throat, chest congestion, cough, had HA yesterday. Has had f/c as well - 101.3 Tm.  Taking otc cold medications. No n/v. Also myalgias. No sob.   No tick bites.   No known sick contacts but just came back from air travel - [place] - 2 days later felt ill.     Taking mucinex DM nighttime and daytime - helps temporarily.       Review of Systems  Per HPI  Prior to Visit Medications    Medication Sig Taking? Authorizing Provider   WEGOVY 1 MG/0.5ML SOAJ SC injection INJECT 0.5 ML (1 MG TOTAL) UNDER THE SKIN EVERY 7 (SEVEN) DAYS. Yes Provider, MD Gregorio   clobetasol (TEMOVATE) 0.05 % ointment APPLY TO RASH ON LEGS TWICE DAILY X 2 WEEKS Yes Caren Chapman MD       Social History     Tobacco Use    Smoking status: Former     Current packs/day: 0.00     Types: Cigarettes     Quit date: 2008     Years since quittin.5    Smokeless tobacco: Never   Vaping Use    Vaping Use: Never used   Substance Use Topics    Alcohol use: Not Currently     Alcohol/week: 2.0 standard drinks of alcohol    Drug use: No            PHYSICAL EXAMINATION:  [ INSTRUCTIONS:  \"[x]\" Indicates a positive item  \"[]\" Indicates a negative item  -- DELETE ALL ITEMS NOT EXAMINED]  Vital Signs: (As obtained by patient/caregiver or practitioner observation)    Blood pressure-  Heart rate-    Respiratory rate-    Temperature-  Pulse oximetry-     Constitutional: [x] Appears well-developed and well-nourished [] No apparent distress      [x] Abnormal- mildly ill  Mental status  [x] Alert and awake  [] Oriented to person/place/time []Able to follow commands      Eyes:  EOM    [x]  Normal  [] Abnormal-  Sclera  [x]  Normal  [] Abnormal -         Discharge [x]  None visible  [] Abnormal -    HENT:   [x] Normocephalic,

## 2024-08-05 NOTE — TELEPHONE ENCOUNTER
From: Holly Khanna  To: Dr. Caren Chapman  Sent: 8/5/2024 4:18 PM EDT  Subject: Covid test    I did a home test and it is positive.

## 2024-10-17 ENCOUNTER — APPOINTMENT (OUTPATIENT)
Dept: URBAN - METROPOLITAN AREA CLINIC 277 | Age: 49
Setting detail: DERMATOLOGY
End: 2024-10-17

## 2024-10-17 DIAGNOSIS — Z80.8 FAMILY HISTORY OF MALIGNANT NEOPLASM OF OTHER ORGANS OR SYSTEMS: ICD-10-CM

## 2024-10-17 DIAGNOSIS — L81.4 OTHER MELANIN HYPERPIGMENTATION: ICD-10-CM

## 2024-10-17 DIAGNOSIS — D18.0 HEMANGIOMA: ICD-10-CM

## 2024-10-17 DIAGNOSIS — D22 MELANOCYTIC NEVI: ICD-10-CM

## 2024-10-17 PROBLEM — D18.01 HEMANGIOMA OF SKIN AND SUBCUTANEOUS TISSUE: Status: ACTIVE | Noted: 2024-10-17

## 2024-10-17 PROBLEM — D22.5 MELANOCYTIC NEVI OF TRUNK: Status: ACTIVE | Noted: 2024-10-17

## 2024-10-17 PROCEDURE — OTHER COUNSELING: OTHER

## 2024-10-17 PROCEDURE — OTHER MIPS QUALITY: OTHER

## 2024-10-17 PROCEDURE — OTHER OBSERVATION: OTHER

## 2024-10-17 PROCEDURE — 99213 OFFICE O/P EST LOW 20 MIN: CPT

## 2024-10-17 ASSESSMENT — LOCATION DETAILED DESCRIPTION DERM
LOCATION DETAILED: LEFT DISTAL PRETIBIAL REGION
LOCATION DETAILED: RIGHT ANTERIOR SHOULDER
LOCATION DETAILED: LEFT RIB CAGE
LOCATION DETAILED: LEFT ANTERIOR SHOULDER
LOCATION DETAILED: RIGHT DISTAL PRETIBIAL REGION

## 2024-10-17 ASSESSMENT — LOCATION ZONE DERM
LOCATION ZONE: LEG
LOCATION ZONE: TRUNK
LOCATION ZONE: ARM

## 2024-10-17 ASSESSMENT — LOCATION SIMPLE DESCRIPTION DERM
LOCATION SIMPLE: LEFT PRETIBIAL REGION
LOCATION SIMPLE: LEFT SHOULDER
LOCATION SIMPLE: RIGHT PRETIBIAL REGION
LOCATION SIMPLE: RIGHT SHOULDER
LOCATION SIMPLE: ABDOMEN

## 2024-12-03 ENCOUNTER — TELEMEDICINE (OUTPATIENT)
Facility: CLINIC | Age: 49
End: 2024-12-03

## 2024-12-03 DIAGNOSIS — J06.9 VIRAL UPPER RESPIRATORY TRACT INFECTION: Primary | ICD-10-CM

## 2024-12-03 PROCEDURE — 99213 OFFICE O/P EST LOW 20 MIN: CPT | Performed by: INTERNAL MEDICINE

## 2024-12-03 RX ORDER — SEMAGLUTIDE 1.7 MG/.75ML
INJECTION, SOLUTION SUBCUTANEOUS
COMMUNITY
Start: 2024-10-30

## 2024-12-03 RX ORDER — DEXTROMETHORPHAN HYDROBROMIDE AND PROMETHAZINE HYDROCHLORIDE 15; 6.25 MG/5ML; MG/5ML
5 SYRUP ORAL 4 TIMES DAILY PRN
Qty: 118 ML | Refills: 0 | Status: SHIPPED | OUTPATIENT
Start: 2024-12-03 | End: 2024-12-10

## 2024-12-03 RX ORDER — PREDNISOLONE ACETATE 10 MG/ML
SUSPENSION/ DROPS OPHTHALMIC
COMMUNITY
Start: 2024-11-14 | End: 2024-12-14

## 2024-12-03 NOTE — PROGRESS NOTES
12/3/2024    TELEHEALTH EVALUATION -- Audio/Visual    HPI:    Holly Khanna (:  1975) has requested an audio/video evaluation for the following concern(s):    Has been feeling ill for the past few days -  was ill first about 10 days ago - tested neg for flu and covid - son now ill with similar symptoms - tested negative for strep and covid and flu.   Fever up to 101.6 - last night  Cough productive of yellow sputum, body aches and fatigue   No sob, not much head congestion.   Diarrhea just today - 2 loose stools. Appetite is okay.   Has been taking mucinex for cough/cold/congestion, robitussin at night. Tylenol for fever.   Fever for the past 3 days  No sore throat    Review of Systems  Per HPI  Prior to Visit Medications    Medication Sig Taking? Authorizing Provider   WEGOVY 1.7 MG/0.75ML SOAJ SC injection INJECT 0.75 ML (1.7 MG TOTAL) UNDER THE SKIN EVERY 7 (SEVEN) DAYS. Yes Gregorio Luevano MD   prednisoLONE acetate (PRED FORTE) 1 % ophthalmic suspension INSTILL 1 DROP INTO BOTH EYES TWICE A DAY X 4 WEEKS Yes ProviderGregorio MD   promethazine-dextromethorphan (PROMETHAZINE-DM) 6.25-15 MG/5ML syrup Take 5 mLs by mouth 4 times daily as needed for Cough Yes Caren Chapman MD   clobetasol (TEMOVATE) 0.05 % ointment APPLY TO RASH ON LEGS TWICE DAILY X 2 WEEKS Yes Caren Chapman MD       Social History     Tobacco Use    Smoking status: Former     Current packs/day: 0.00     Types: Cigarettes     Quit date: 2008     Years since quittin.8    Smokeless tobacco: Never   Vaping Use    Vaping status: Never Used   Substance Use Topics    Alcohol use: Not Currently     Alcohol/week: 2.0 standard drinks of alcohol    Drug use: No            PHYSICAL EXAMINATION:  [ INSTRUCTIONS:  \"[x]\" Indicates a positive item  \"[]\" Indicates a negative item  -- DELETE ALL ITEMS NOT EXAMINED]  Vital Signs: (As obtained by patient/caregiver or practitioner observation)    Blood pressure-  Heart

## 2024-12-03 NOTE — PROGRESS NOTES
Holly Khanna  Identified pt with two pt identifiers(name and ).     Chief Complaint   Patient presents with    Cough    Fever    Generalized Body Aches       Reviewed record In preparation for visit and have obtained necessary documentation.     1. Have you been to the ER, urgent care clinic or hospitalized since your last visit? No     2. Have you seen or consulted any other health care providers outside of the [facility] since your last visit? Include any pap smears or colon screening. No    Vitals reviewed with provider.    Health Maintenance reviewed:     Health Maintenance Due   Topic    Hepatitis C screen     Hepatitis B vaccine (1 of 3 - 19+ 3-dose series)    Diabetes screen     DTaP/Tdap/Td vaccine (2 - Td or Tdap)    Colorectal Cancer Screen     Flu vaccine (1)    COVID-19 Vaccine ( -  season)          Wt Readings from Last 3 Encounters:   24 110.2 kg (243 lb)   23 110.5 kg (243 lb 8 oz)   22 108.9 kg (240 lb 1.6 oz)        Temp Readings from Last 3 Encounters:   24 97.8 °F (36.6 °C) (Oral)   23 98.5 °F (36.9 °C) (Oral)        BP Readings from Last 3 Encounters:   24 109/77   23 114/69   22 110/70        Pulse Readings from Last 3 Encounters:   24 83   23 58   22 58      [unfilled]       Learning Assessment:   :         2023     9:00 AM   [facility] AMB LEARNING ASSESSMENT   Primary Learner Patient   level of education 4 YEARS OF COLLEGE   Barriers Factors NONE   co-learner caregiver No   Primary Language ENGLISH   Learning Preference DEMONSTRATION   Answered By patient   Relationship to Learner SELF         Fall Risk Assessment:   :         2024     9:50 AM   Amb Fall Risk Assessment and TUG Test   Do you feel unsteady or are you worried about falling?  no   2 or more falls in past year? no   Fall with injury in past year? no          Abuse Screening:   :          No data to display                   ADL

## 2024-12-09 ENCOUNTER — PATIENT MESSAGE (OUTPATIENT)
Facility: CLINIC | Age: 49
End: 2024-12-09

## 2024-12-09 RX ORDER — AZITHROMYCIN 250 MG/1
TABLET, FILM COATED ORAL
Qty: 6 TABLET | Refills: 0 | Status: SHIPPED | OUTPATIENT
Start: 2024-12-09 | End: 2024-12-19